# Patient Record
Sex: MALE | Race: WHITE | NOT HISPANIC OR LATINO | Employment: FULL TIME | URBAN - METROPOLITAN AREA
[De-identification: names, ages, dates, MRNs, and addresses within clinical notes are randomized per-mention and may not be internally consistent; named-entity substitution may affect disease eponyms.]

---

## 2017-07-18 ENCOUNTER — OFFICE VISIT (OUTPATIENT)
Dept: BEHAVIORAL HEALTH | Facility: PHYSICIAN GROUP | Age: 52
End: 2017-07-18
Payer: COMMERCIAL

## 2017-07-18 VITALS — SYSTOLIC BLOOD PRESSURE: 146 MMHG | BODY MASS INDEX: 23.6 KG/M2 | WEIGHT: 164.5 LBS | DIASTOLIC BLOOD PRESSURE: 101 MMHG

## 2017-07-18 DIAGNOSIS — F33.1 MODERATE EPISODE OF RECURRENT MAJOR DEPRESSIVE DISORDER (HCC): ICD-10-CM

## 2017-07-18 DIAGNOSIS — F41.0 PANIC DISORDER WITHOUT AGORAPHOBIA: ICD-10-CM

## 2017-07-18 PROCEDURE — 99204 OFFICE O/P NEW MOD 45 MIN: CPT | Performed by: STUDENT IN AN ORGANIZED HEALTH CARE EDUCATION/TRAINING PROGRAM

## 2017-07-18 NOTE — PROGRESS NOTES
PSYCHIATRIC Evaluation:  Requesting Physician: Walk-in  Supervising Physician:   Dr. Galicia  PCP: None at this time. Previously seen by Dr. Grajeda       ID: 53 y/o white male  who served 5 years in the [facility] ([facility]),  with long hx of depression and anxiety, presenting for new visit evaluation today, was previously followed by PCP for his psychiatric needs (last seen about 1 year ago).     Chief Complaint: Depression and anxiety    HPI:   Patient self-presented for new establishment visit with psychiatric evaluation completed today. Says he was followed by his PCP Dr. Grajeda who was prescribing Zoloft,Zyprexa, Klonopin (as needed) for his 'Bipolar-Depression'  but says his PCP 'moved on' and is not seeing him anymore. Says he has been off his Zoloft for 2-3 months (25mg daily) and ran out of his zyprexa (7.5mg daily) last week. Says he plans to get re-established with a new PCP- [facility]. 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 Says that his anxiety gets bad enough where he is unable to function at times and feels 'frozen.' Explains that he has been more depressed lately along with being more emotional and tearful with bouts of suicidal ideation last month- described as having recurring intrusive thoughts that may last a few days. Denies suicidal or homicidal ideation at this time and says he would never hurt himself and put his children though pain. Stress factors include recent divorce in March of 2017, currently lives with ex-wife who is an 'alcoholic.'    Psychiatric Review of Systems:current symptoms as reported by pt.  Depression:  Currently 'depressed' with low mood and energy. Sleep is not effected at this time but has hx of insomnia with severe  depression. +Guilt, poor concentration. Negative for Psychomotor retardation/agitation. +suicidal ideation last month but NONE at this time. Describes being more isolative lately which is what he says what happens most often when he is depressed.   Yane: denies hx of yane  Anxiety/Panic Attacks: increase anxiety over last 3-4 years with panic attacks weekly- refer to HPI. Of note: patient drinks 5-6 cups of coffee daily.   PTSD symptom: denies   Psychosis: denies all symptoms         Medical Review of Systems: as reported by pt. All systems reviewed. Only those found to be + are noted below. All others are negative.   Neurological:    TBIs: denies   SZs: denies   Strokes: denies     Other medical symptoms:   Thyroid: denies   Diabetes:denies   Cardiovascular disease: denies    Psychiatric Examination: observed phenomenon:  Vitals: 146/101 repeat was 159/106, denies headache, vision changes- likely high due to current anxiety  Musculoskeletal- mildly tremulous but otherwise WNL  Appearance: young male in 50's, appropriately groomed,   Behavior: answers all questions appropriately, mildly tearful when talking about depression.  Thoughts: linear, organized. -paranoia/delusions. -Ah/Vh.  Speech: mildly lethargic with mild decrease in volume.   Mood:  'depressed'  Affect:   Mood congruent, anxious  SI/HI:   Denies- suicidal ideation (roughly 1 month ago but none at this time).  Attention/Alertness: alert     Memory: grossly intact as evident by his thorough psychiatric and childhood history  Orientation:   Oriented to person, place, time, and situation  Fund of Knowledge:    Grossly intact  Insight/Judgement into symptoms:  Good/good        Past Psychiatric Hx:   -First seen psychiatrist post - career in his 20's after ex-wife recommended he see someone for his 'depression/isolation' - cannot remember name but was seeing her 'for many years'  -Last seen Psychiatrist Dr. Joya >2 years ago.   -Most recently  "followed up by PCP Dr. Grajeda for his psychiatric needs- however, Dr. Grajeda is not seeing him anymore and needs to be re-established with mental health for his psychotropic medications.   -Denies hx of Physical or Sexual Abuse  -Denies hx of Suicidal attempt  -Denies hx of Inpatient Psychiatric Hospitalizations  -Hx of seeing therapist X 1 about 10-12 years ago for 2 years- worked with his depression    PREVIOUS PSYCH MEDS:  -zoloft 25mg daily (last taken 2-3 months ago)  -zyprexa 7.5mg daily (last taken 1 week ago)  -Klonopin (last taken 2-3 months ago) - 0.5-1mg roughly 4 days out of the week. Denies withdrawal when he stopped.     Family Psychiatric Hx:  -mother with anxiety  -father with depression  - 1 sister (24y/o) and 1 brother (60y/o) with depression    Social Hx:  -Served in [employer] from [employer]' with honorable discharge - states he has his .  -Divorce X2 (most recently relationship with ex-wife of 7 years. )  -Currently lives with ex-wife ( in March of 17') and step son who is 18y/o  -Has 2 children from previous marriage living out of state: F 24y/o and M 26y/o      High school graduate  Currently works at the \"main\" postoffice in [place], [place]- greater than 20 years.       Drug/Alcohol/Tobacco Hx:   Drugs: denies, says he tried marijuana in his teens   Alcohol: denies   Tobacco: 1 PPD   Coffee: 5-6 cups daily    Medical Hx: labs, MARS, medications, etc were reviewed. Only those findings of potential interest to psychiatry are noted below:  Medical Conditions:   denies  Allergies: NKDA  Medications (currently prescribed at [facility]): none at this time  Labs: Previous labs from January 2016  ECG: none on file    Cranial Imaging: none on file        ASSESSMENT:   51 y/o male with long hx of Depression and Anxiety presenting for new mental health evaluation, wants to be established with Psychiatry for his psychotropic medications (previously followed by PCP for mental health needs). Although patient " appears to have physical symptoms of depression, his sleep is not effected at this time and currently denies suicidal and homicidal ideation. Patient appears to have been under-medicated with Zoloft previously but states that he has no side effects and it has 'worked some.' Will not restart his Zyprexa as I believe patient does not meet criteria for Bipolar or Psychosis at this time. Will trial Zoloft for depression and anxiety and will titrate dose as tolerated. Educated patient today on effects of caffeine and anxiety and I have requested patient to minimize his intake, which he a acknowledges. Patient would benefit from individual therapy and group therapy- will provide necessary resources/set up appointment today. Patient educated that he may want to get established with new PCP (currently does not have PCP) [facility] as he has served 5 years in the  with honorable discharged and currently has his  at home.     #Major Depressive Disorder  #JAY JAY- general anxiety disorder with panic attack   -r/o substance induced anxiety disorder (Risk factors: patient drinks 5-6 cups of coffee daily and smokes 1PPD)      PLAN:  -Trial Zoloft 50mg PO Daily for Depression and Anxiety/Panic attack.   -LABS (new visit) : CBC, CMP, Lipid panel, TSH and Free T4  -Recommend: Individual therapy, will need assessment for IOP vs PHP- will provide appropriate appointments today; Cut down on caffeine intake . Cut down on Smoking (currently at 1ppd)  -Needs new PCP, [facility] - f/u for hypertension (today 146/101 without symptoms) - likely increased due to anxiety.   -1 month f/u with mental health

## 2017-07-19 ENCOUNTER — OFFICE VISIT (OUTPATIENT)
Dept: BEHAVIORAL HEALTH | Facility: PHYSICIAN GROUP | Age: 52
End: 2017-07-19
Payer: COMMERCIAL

## 2017-07-19 DIAGNOSIS — F33.1 MAJOR DEPRESSIVE DISORDER, RECURRENT EPISODE, MODERATE (HCC): ICD-10-CM

## 2017-07-19 DIAGNOSIS — F41.1 GAD (GENERALIZED ANXIETY DISORDER): ICD-10-CM

## 2017-07-19 PROCEDURE — 90791 PSYCH DIAGNOSTIC EVALUATION: CPT | Performed by: MARRIAGE & FAMILY THERAPIST

## 2017-07-19 NOTE — BH THERAPY
[facility]  INITIAL ASSESSMENT    Name: Bruno Rai  MRN: [id number]  : 1965  Age: 52 y.o.  Date of assessment: 2017  PCP: Alex Grajeda M.D.  Persons in attendance: Patient  Total session time: 45 minutes      CHIEF COMPLAINT AND HISTORY OF PRESENTING PROBLEM:  (as stated by Patient):  Bruno Rai is a 52 y.o., White male referred for assessment by No ref. provider found.  Primary presenting issue includes   Chief Complaint   Patient presents with   • Depression   • Anxiety   .     FAMILY/SOCIAL HISTORY  Current living situation/household members: lives w/ ex wife ( in March after alycia of 7 yrs, living together till house sells) and her 16 yo son  Relevant family history/structure/dynamics: alycia 1st time for 20 yrs, has 2 grown children in [place], son 27, dalila 25 and one grandson; mom  10yrs ago, fa when he was in [employer]  Current family/social stressors: divorce; lack of support mostly due to depression  Quality/quantity of current family and/or social support: none  Does patient/parent report a family history of behavioral health issues, diagnoses, or treatment? Yes  Family History   Problem Relation Age of Onset   • Anxiety disorder Mother    • Depression Father    • Depression Sister    • Depression Brother    • No Known Problems Brother         BEHAVIORAL HEALTH TREATMENT HISTORY  Does patient/parent report a history of prior behavioral health treatment for patient? Yes:    Dates Level of Care Facilty/Provider Diagnosis/Problem Medications   In mid 's OP unk depr none   2 yrs ago same Dr. Dalal same Zyprexa                                                                 History of untreated behavioral health issues identified? No    MEDICAL HISTORY  Primary care behavioral health screenings: Patient Health Questionaire                                     If depressive symptoms identified deferred to follow up visit unless specifically addressed in  assesment and plan.    Interpretation of PHQ-9 Total Score   Score Severity   1-4 No Depression   5-9 Mild Depression   10-14 Moderate Depression   15-19 Moderately Severe Depression   20-27 Severe Depression       Past medical/surgical history:   Past Medical History   Diagnosis Date   • Heart burn    • Indigestion    • Dental disorder      upper and lower dentures   • Psychiatric problem      depression,anxiety      Past Surgical History   Procedure Laterality Date   • Other       vasectomy   • Biopsy general N/A 1/8/2016     Procedure: BIOPSY GENERAL PENILE;  Surgeon: Abel Kee M.D.;  Location: SURGERY [facility];  Service:    • Vasectomy          Medication Allergies:  Review of patient's allergies indicates no known allergies.   Medical history provided by patient during current evaluation: none relevant    Patient reports last physical exam: last year   Does patient/parent report any history of or current developmental concerns? No  Does patient/parent report nutritional concerns? Yes poor appetite  Does patient/parent report change in appetite or weight loss/gain? Yes has lost 30# in 3 yrs  Does patient/parent report history of eating disorder symptoms? No  Does patient/parent report dental problem? No  Does patient/parent report physical pain? No   Indicate if pain is acute or chronic, and location: na   Pain scale rating:       Does patient/parent report functional impact of medical, developmental, or pain issues?   N\A    EDUCATIONAL/LEARNING HISTORY  Is patient currently enrolled in a school/educational program?   No:   Highest grade level completed: some evelin  School performance/functioning: not asked  History of Special Education/repeated grades/learning issues: no  Preferred learning style: all  Current learning needs (large print, language barrier, etc):  no    EMPLOYMENT/RESOURCES  Is the patient currently employed? 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 Yes  Does patient identify impact of presenting issue on work functioning? Yes  Work or income-related stressors:  Stress from current position, anxiety began when promoted, has tried to return to being an employee but been refused; wants to transfer out of main PO     HISTORY:  Does patient report current or past enlistment? Yes [employer]   [If yes, complete below items]  Does patient report history of exposure to combat? No  Does patient report history of  sexual trauma? No  Does patient report other -related stressors? No    SPIRITUAL/CULTURAL/IDENTITY:  What are the patient’s/family’s spiritual beliefs or practices? Not asked  What is the patient’s cultural or ethnic background/identity? cauc  How does the patient identify their sexual orientation? het  How does the patient identify their gender? male  Does the patient identify any spiritual/cultural/identity factors as relevant to the presenting issue? No    LEGAL HISTORY  Has the patient ever been involved with juvenile, adult, or family legal systems? No   [If yes, trigger section below:]  Does patient report ever being a victim of a crime?  No  Does patient report involvement in any current legal issues?  No  Does patient report ever being arrested or committing a crime? No  Does patient report any current agency (parole/probation/CPS/) involvement? No    ABUSE/NEGLECT/TRAUMA SCREENING  Does patient report feeling “unsafe” in his/her home, or afraid of anyone? No  Does patient report any history of physical, sexual, or emotional abuse? No  Does parent or significant other report any of the above? na  Is there evidence of neglect by self? No  Is there evidence of neglect by a caregiver? No  Does the patient/parent report any history of CPS/APS/police involvement related to suspected abuse/neglect or domestic violence? No  Does the patient/parent report any other history of  potentially traumatic life events? No  Based on the information provided during the current assessment, is a mandated report of suspected abuse/neglect being made?  No     SAFETY ASSESSMENT - SELF  Does patient acknowledge current or past symptoms of dangerousness to self? Yes SI in past, not current  Does parent/significant other report patient has current or past symptoms of dangerousness to self? na      Recent change in frequency/specificity/intensity of suicidal thoughts or self-harm behavior? No  Current access to firearms, medications, or other identified means of suicide/self-harm? No  If yes, willing to restrict access to means of suicide/self-harm? na  Protective factors present: Moral objection to suicide and Actively engaged in treatment    Current Suicide Risk: Low  Crisis Safety Plan completed and copy given to patient: No    SAFETY ASSESSMENT - OTHERS  Does paor past symptoms of aggressive behavior or risk to others? No  Does parent/significant othtient acknowledge current or past symptoms of aggressive behavior or risk to others? na  Does parent/significant other report patient has current or past symptoms of aggressive behavior or risk to others? na    Recent change in frequency/specificity/intensity of thoughts or threats to harm others? No  Current access to firearms/other identified means of harm? No  If yes, willing to restrict access to weapons/means of harm? na  Protective factors present: Well-developed sense of empathy, Stable employment and Low rumination/obsession    Current Homicide Risk:  Not applicable  Crisis Safety Plan completed and copy given to patient? No  Based on information provided during the current assessment, is a mandated “duty to warn” being exercised? No    SUBSTANCE USE/ADDICTION HISTORY  [] Not applicable - patient 10 years of age or younger    Is there a family history of substance use/addiction? No  Does patient acknowledge or parent/significant other report use  of/dependence on substances? No  Last time patient used alcohol: yrs  Within the past week? No  Last time patient used marijuana: teens  Within the past month? No  Any other street drugs ever tried even once? No  Any use of prescription medications/pills without a prescription, or for reasons others than originally prescribed?  No  Any other addictive behavior reported (gambling, shopping, sex)? No     Drug History:  Amphetamine:  Amphetamine frequency: Never used      Cannibis:  Cannabis frequency: Past rare use  Cannabis method: Smoke      Cocaine:  Cocaine frequency: Never used      Ecstasy:  Ecstasy frequency: Never used      Hallucinogen:  Hallucinogen frequency: Never used      Inhalant:   Inhalant frequency: Never used      Opiate:  Opiate frequency: Never used  Cannabis frequency: Past rare use      Other:  Other drug frequency: Never used      Sedative:           What consequences does the patient associate with any of the above substance use and or addictive behaviors? None    Patient’s motivation/readiness for change: na    [] Patient denies use of any substance/addictive behaviors    STRENGTHS/ASSETS  Strengths Identified by interviewer: Insight into problems, Self-awareness, Cognitive flexibility and History of effective treatment  Strengths Identified by patient: good w/ people, responsible    MENTAL STATUS/OBSERVATIONS   Participation: Active verbal participation, Attentive and Open to feedback  Grooming: Neat  Orientation:Alert   Behavior: Calm  Eye contact: Good   Mood:Depressed and Anxious  Affect:Sad and Anxious  Thought process: Logical  Thought content:  Within normal limits  Speech: Rate within normal limits  Perception: Within normal limits  Memory: No gross evidence of memory deficits  Insight: Good  Judgment:  Good  Other:    Family/couple interaction observations: na    RESULTS OF SCREENING MEASURES:  [] Not applicable  Measure:   Score:     Measure:   Score:       CLINICAL FORMULATION: 52  yo Bruno here for long term depression and increasing anxiety for past 3 yrs, dating from his elevation to supervisor, which he wishes he hadn't done; says he feels hopeless and w/ low energy; just makes himself get up in the morning but doesn't enjoy anything; does golf but isn't really into anything; used to exercise but not now; doesn't eat well; knows he needs to stop smoking, did for 5 years but started again when his mom got sick w/ cancer and  10 yrs ago; drinks too much coffee; gets 3-4 panic attacks a week in the mornings; disc IOP but his schedule won't allow; gave him handouts from [facility] and recommended he get the book; disc self-talk; also do one positive thing daily, he chose exercising for 1/2 hour; also gave numbers for Smoking Cessation program with [facility] and central scheduling for a new PCP      DIAGNOSTIC IMPRESSION(S):  1. Major depressive disorder, recurrent episode, moderate (CMS-HCC)    2. JAY JAY (generalized anxiety disorder)          IDENTIFIED NEEDS/PLAN:  [If any of these marked, trigger DISPOSITION list]  Mood/anxiety and Occupational/Educational  Actively being addressed by [facility]    Does patient express agreement with the above plan? Yes     Referral appointment(s) scheduled? w/ this therapist       FEROZ Eckert.

## 2017-07-25 ENCOUNTER — APPOINTMENT (OUTPATIENT)
Dept: MEDICAL GROUP | Facility: MEDICAL CENTER | Age: 52
End: 2017-07-25
Payer: COMMERCIAL

## 2017-07-26 ENCOUNTER — TELEPHONE (OUTPATIENT)
Dept: MEDICAL GROUP | Facility: MEDICAL CENTER | Age: 52
End: 2017-07-26

## 2017-07-27 ENCOUNTER — OFFICE VISIT (OUTPATIENT)
Dept: MEDICAL GROUP | Facility: MEDICAL CENTER | Age: 52
End: 2017-07-27
Payer: COMMERCIAL

## 2017-07-27 VITALS
RESPIRATION RATE: 16 BRPM | SYSTOLIC BLOOD PRESSURE: 116 MMHG | HEART RATE: 75 BPM | HEIGHT: 71 IN | OXYGEN SATURATION: 95 % | TEMPERATURE: 98.7 F | BODY MASS INDEX: 23.04 KG/M2 | DIASTOLIC BLOOD PRESSURE: 78 MMHG | WEIGHT: 164.6 LBS

## 2017-07-27 DIAGNOSIS — Z00.00 PHYSICAL EXAM: ICD-10-CM

## 2017-07-27 DIAGNOSIS — Z80.0 FAMILY HISTORY OF COLON CANCER: ICD-10-CM

## 2017-07-27 DIAGNOSIS — F17.200 TOBACCO DEPENDENCE: ICD-10-CM

## 2017-07-27 DIAGNOSIS — Z76.89 ENCOUNTER TO ESTABLISH CARE: ICD-10-CM

## 2017-07-27 DIAGNOSIS — F33.1 MAJOR DEPRESSIVE DISORDER, RECURRENT EPISODE, MODERATE (HCC): ICD-10-CM

## 2017-07-27 DIAGNOSIS — N48.9 PENILE LESION: ICD-10-CM

## 2017-07-27 PROCEDURE — 99396 PREV VISIT EST AGE 40-64: CPT | Performed by: PHYSICIAN ASSISTANT

## 2017-07-27 ASSESSMENT — PATIENT HEALTH QUESTIONNAIRE - PHQ9
5. POOR APPETITE OR OVEREATING: 3 - NEARLY EVERY DAY
SUM OF ALL RESPONSES TO PHQ QUESTIONS 1-9: 13
CLINICAL INTERPRETATION OF PHQ2 SCORE: 3

## 2017-07-27 NOTE — ASSESSMENT & PLAN NOTE
This is a 52-year-old male who comes in today to establish care. During his psychiatry visit his blood pressure was evaluated and twice it was elevated. It was 140/100. He has no history of any known hypertension. Denies any chest pain or shortness of breath. He is generally healthy. He does smoke. Visit history of depression and anxiety. Takes Zoloft daily. He works for the [employer]. Currently is involved with a girlfriend who is from [place].

## 2017-07-27 NOTE — ASSESSMENT & PLAN NOTE
Spoke to him about the penile lesion. States he will use a steroid cream that keeps it from bothering him. It is a chronic condition.

## 2017-07-27 NOTE — ASSESSMENT & PLAN NOTE
He states that he had a colonoscopy about 5 years ago. Was told he needs one every 3-5 years. He has a mother who  of thyroid cancer but she also had a mass in her colon. She was in her early 70s. He did have a polyp removed last colonoscopy.

## 2017-07-27 NOTE — Clinical Note
[facility]  Tello Mendoza PA-C  [address]  [facility] [address]  Fax: [phone]   Authorization for Release/Disclosure of   Protected Health Information   Name: JUAQUIN WALKER : 1965 SSN: [id number]   Address: [facility]  [place] [address] Phone:    [phone] (home)    I authorize the entity listed below to release/disclose the PHI below to:   [facility]/Tello Mendoza PA-C and Tello Mendoza PA-C   Provider or Entity Name:  [facility]   Address   City, State, Zip [place], [place]   Phone:      Fax:     Reason for request: continuity of care   Information to be released:    [xx  ] LAST COLONOSCOPY,  including any PATH REPORT and follow-up  [  ] LAST FIT/COLOGUARD RESULT [  ] LAST DEXA  [  ] LAST MAMMOGRAM  [  ] LAST PAP  [  ] LAST LABS [  ] RETINA EXAM REPORT  [  ] IMMUNIZATION RECORDS  [  ] Release all info      [  ] Check here and initial the line next to each item to release ALL health information INCLUDING  _____ Care and treatment for drug and / or alcohol abuse  _____ HIV testing, infection status, or AIDS  _____ Genetic Testing    DATES OF SERVICE OR TIME PERIOD TO BE DISCLOSED: _____________  I understand and acknowledge that:  * This Authorization may be revoked at any time by you in writing, except if your health information has already been used or disclosed.  * Your health information that will be used or disclosed as a result of you signing this authorization could be re-disclosed by the recipient. If this occurs, your re-disclosed health information may no longer be protected by State or Federal laws.  * You may refuse to sign this Authorization. Your refusal will not affect your ability to obtain treatment.  * This Authorization becomes effective upon signing and will  on (date) __________.      If no date is indicated, this Authorization will  one (1) year from the signature date.    Name: Juaquin Walker    Signature:   Date:     2017            PLEASE FAX REQUESTED RECORDS BACK TO: [phone]

## 2017-07-27 NOTE — PROGRESS NOTES
"Subjective:   Bruno Rai is a 52 y.o. male here today for establishing care and for being told he may have hypertension after 2 blood pressure readings at the psychiatry office.    Physical exam  This is a 52-year-old male who comes in today to establish care. During his psychiatry visit his blood pressure was evaluated and twice it was elevated. It was 140/100. He has no history of any known hypertension. Denies any chest pain or shortness of breath. He is generally healthy. He does smoke. Visit history of depression and anxiety. Takes Zoloft daily. He works for the [employer]. Currently is involved with a girlfriend who is from [place].    Penile lesion  Spoke to him about the penile lesion. States he will use a steroid cream that keeps it from bothering him. It is a chronic condition.    Family history of colon cancer  He states that he had a colonoscopy about 5 years ago. Was told he needs one every 3-5 years. He has a mother who  of thyroid cancer but she also had a mass in her colon. She was in her early 70s. He did have a polyp removed last colonoscopy.         Current medicines (including changes today)  Current Outpatient Prescriptions   Medication Sig Dispense Refill   • Multiple Vitamins-Minerals (MULTI FOR HIM 50+ PO) Take  by mouth.     • sertraline (ZOLOFT) 50 MG Tab Take 1 Tab by mouth every day. 30 Tab 2     No current facility-administered medications for this visit.     He  has a past medical history of Heart burn; Indigestion; Dental disorder; and Psychiatric problem.    ROS   No chest pain, no shortness of breath, no abdominal pain and all other systems were reviewed and are negative.       Objective:     Blood pressure 116/78, pulse 75, temperature 37.1 °C (98.7 °F), resp. rate 16, height 1.797 m (5' 10.75\"), weight 74.662 kg (164 lb 9.6 oz), SpO2 95 %. Body mass index is 23.12 kg/(m^2).   Physical Exam:  Constitutional: Alert, no distress.  Skin: Warm, dry, good turgor, no rashes " in visible areas.  Eye: Equal, round and reactive, conjunctiva clear, lids normal.  ENMT: Lips without lesions, good dentition, oropharynx clear.  Neck: Trachea midline, no masses.   Lymph: No cervical or supraclavicular lymphadenopathy  Respiratory: Unlabored respiratory effort, lungs clear to auscultation, no wheezes, no ronchi.  Cardiovascular: Normal S1, S2, no murmur, no edema.  Abdomen: Soft, non-tender, no masses.  Psych: Alert and oriented x3, normal affect and mood.    Repeated blood pressure reading it was 118/58.    Assessment and Plan:   The following treatment plan was discussed    1. Physical exam  Blood pressure today is within normal limits. We'll add PSA level to the other orders from his psychiatry. Will contact with results.  - PROSTATE SPECIFIC AG DIAGNOSTIC; Future    2. Family history of colon cancer  Referred to GI for colonoscopy screening. We'll try to obtain previous medical records.  - REFERRAL TO GASTROENTEROLOGY    3. Tobacco dependence  In future we'll discuss smoking cessation further although currently he is on patches.    4. Penile lesion  Did not evaluate but it will monitor in the future. May continue steroid cream as directed.    5. Major depressive disorder, recurrent episode, moderate (CMS-HCC)  Chronic condition. Follows with psychiatry. Continue Zoloft as directed.  - Patient has been identified as being depressed and appropriate orders and counseling have been given    6. Encounter to establish care      Followup: No Follow-up on file.    Please note that this dictation was created using voice recognition software. I have made every reasonable attempt to correct obvious errors, but I expect that there are errors of grammar and possibly content that I did not discover before finalizing the note.

## 2017-08-15 ENCOUNTER — OFFICE VISIT (OUTPATIENT)
Dept: BEHAVIORAL HEALTH | Facility: PHYSICIAN GROUP | Age: 52
End: 2017-08-15
Payer: COMMERCIAL

## 2017-08-15 DIAGNOSIS — F33.1 MAJOR DEPRESSIVE DISORDER, RECURRENT EPISODE, MODERATE (HCC): ICD-10-CM

## 2017-08-15 DIAGNOSIS — F41.1 GAD (GENERALIZED ANXIETY DISORDER): ICD-10-CM

## 2017-08-15 PROCEDURE — 99214 OFFICE O/P EST MOD 30 MIN: CPT | Performed by: STUDENT IN AN ORGANIZED HEALTH CARE EDUCATION/TRAINING PROGRAM

## 2017-08-15 RX ORDER — SERTRALINE HYDROCHLORIDE 100 MG/1
TABLET, FILM COATED ORAL
Qty: 45 TAB | Refills: 1 | Status: SHIPPED | OUTPATIENT
Start: 2017-08-15 | End: 2017-10-23 | Stop reason: SDUPTHER

## 2017-08-15 NOTE — PROGRESS NOTES
[facility]  PSYCHIATRIC FOLLOW-UP NOTE    Name: Bruno Rai  MRN: [id number]  : 1965  Age: 52 y.o.  Date of assessment: 8/15/2017  PCP: Tello Mendoza PA-C  Persons in attendance: Patient  Total face-to-face time: 30 minutes    REASON FOR VISIT/CHIEF COMPLAINT (as stated by Patient):  Bruno Rai is a 52 y.o., White male, attending follow-up appointment for depression/anxiety, previously seen by this writer on 17.    CURRENT PSYCHOTROPIC MEDS:  -zoloft 50mg PO Q Daily.    HISTORY OF PRESENT ILLNESS:    Says that since his previous appointment he has cut down on his caffeine intake from 6 cups/day to currently 3 cups day. Says that he continues to have intermittent depressed mood and does not feel any changes from his current dose of Zoloft. Has attended his intake evaluation for behavioral health psychotherapy and plans to continue with his therapy. 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 Sleeping 6 hours nightly, says appetite is fair/poor at times but otherwise no further changes noted. Talked about titration Zoloft to maximize therapeutic benefit and patient agrees.     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  Participation: Active verbal participation  Grooming:Casual  Orientation: Fully Oriented  Eye contact: Good  Behavior:Calm   Mood:  Depressed  Affect: Constricted  Thought process: Logical  Thought content:  Within normal limits  Speech: Rate within normal limits  Perception:  Within normal limits  Memory:  No gross evidence of memory deficits  Insight: Good  Judgment: Good      Current risk:    Suicide: Low   Homicide: Low   Self-harm: Low  Relapse: Low  Other:   Crisis Safety Plan reviewed?Yes  If evidence of imminent risk is present, intervention/plan:    Medical Records/Labs/Diagnostic Tests Reviewed: none from 2017    Medical Records/Labs/Diagnostic Tests Ordered: CBC/CMP/LIPIDS/TSH and Free T4/Vit D/Vit B12 ordered today    DIAGNOSTIC IMPRESSION(S):  No diagnosis found.         ASSESSMENT:   53 y/o male with long hx of Depression and Anxiety presenting for follow-up visit. Although patient appears to have physical symptoms of depression, his sleep is not effected at this time and currently denies suicidal and homicidal ideation. Patient appears to have been under-medicated with Zoloft previously but states that he has no side effects and it has 'worked some,' currently states no benefit from 50mg Daily.  Will not restart his Zyprexa as I believe patient does not meet criteria for Bipolar or Psychosis at this time. Will trial Zoloft for depression and anxiety and will titrate dose as tolerated. Educated patient today on effects of caffeine and anxiety and I have requested patient to minimize his intake, which he a acknowledges. Patient encouraged to go to individual therapy and group therapy. Patient [facility] years in the  with honorable discharged and currently has his  at home.     #Major Depressive Disorder  #JAY JAY- general anxiety disorder with panic attack    -r/o substance induced anxiety disorder (Risk factors: patient drinks 3 cups/day (down from 6 cups/day 1 month ago),  smokes 1PPD)      PLAN:  -Trial Zoloft 50mg PO Daily for Depression and Anxiety/Panic  attack. Increase to 100mg PO Daily X 2 weeks, then increase to 150mg PO Daily thereafter. Will maximize therapeutic benefit before trial of SNRI.   -LABS (new visit) : CBC/CMP/LIPIDS/TSH and Free T4/Vit D/Vit B12 ordered today  -Recommend: Continue Individual therapy, will provide appropriate appointments today; Cut down on caffeine intake (currently at 3 cups/daily (down from 6 cups/daily) . Cut down on Smoking (currently at 1ppd)  -Established new PCP with [facility], [facility], [facility] (honerable discharge- [facility])  -1 month f/u with mental health        Maged Lopez M.D.

## 2017-09-18 ENCOUNTER — APPOINTMENT (OUTPATIENT)
Dept: BEHAVIORAL HEALTH | Facility: PHYSICIAN GROUP | Age: 52
End: 2017-09-18
Payer: COMMERCIAL

## 2017-10-16 ENCOUNTER — APPOINTMENT (OUTPATIENT)
Dept: BEHAVIORAL HEALTH | Facility: PHYSICIAN GROUP | Age: 52
End: 2017-10-16
Payer: COMMERCIAL

## 2017-10-17 ENCOUNTER — OFFICE VISIT (OUTPATIENT)
Dept: BEHAVIORAL HEALTH | Facility: PHYSICIAN GROUP | Age: 52
End: 2017-10-17

## 2017-10-17 DIAGNOSIS — F41.1 GAD (GENERALIZED ANXIETY DISORDER): ICD-10-CM

## 2017-10-17 DIAGNOSIS — F33.1 MAJOR DEPRESSIVE DISORDER, RECURRENT EPISODE, MODERATE (HCC): ICD-10-CM

## 2017-10-17 PROCEDURE — 90834 PSYTX W PT 45 MINUTES: CPT | Performed by: MARRIAGE & FAMILY THERAPIST

## 2017-10-17 NOTE — BH THERAPY
[facility]  Therapy Progress Note    Patient Name: Bruno Rai  Patient MRN: [id number]  Today's Date: 10/17/2017     Type of session:Individual psychotherapy  Length of session: 45 minutes  Persons in attendance:Patient    Subjective/New Info: has been feeling more depressed; tried reconciling w wife and it didn't work but they are still living in house and he's unhappy; indecisive about what he wants to happen; is applying for jobs in the [place] near his son, unsure when/if he will hear; continues to be unhappy w/ his job; didn't follow up w/ smoking cessation    Objective/Observations:   Participation: Active verbal participation   Grooming: Neat   Cognition: Alert   Eye contact: Good   Mood: Depressed   Affect: Sad   Thought process: Logical   Speech: Rate within normal limits   Other:     Diagnoses:   1. Major depressive disorder, recurrent episode, moderate (CMS-HCC)    2. JAY JAY (generalized anxiety disorder)         Current risk:   SUICIDE: Low   Homicide: Not applicable   Self-harm: Not applicable   Relapse: Not applicable   Other:    Safety Plan reviewed? Not Indicated   If evidence of imminent risk is present, intervention/plan:     Therapeutic Intervention(s): Cognitive modification, Distress tolerance skills, Goal-setting, Problem-solving and Self-care skills    Treatment Goal(s)/Objective(s) addressed: says sometimes he wants to leave, sometimes to stay (w/ wife); will try to do things for self     Progress toward Treatment Goals: Mild decline    Plan:  - Next appointment scheduled:  10/31/2017    NOEL Eckert  10/17/2017

## 2017-10-23 ENCOUNTER — OFFICE VISIT (OUTPATIENT)
Dept: BEHAVIORAL HEALTH | Facility: PHYSICIAN GROUP | Age: 52
End: 2017-10-23
Payer: COMMERCIAL

## 2017-10-23 DIAGNOSIS — F33.1 MAJOR DEPRESSIVE DISORDER, RECURRENT EPISODE, MODERATE (HCC): ICD-10-CM

## 2017-10-23 DIAGNOSIS — F41.1 GAD (GENERALIZED ANXIETY DISORDER): ICD-10-CM

## 2017-10-23 PROCEDURE — 99214 OFFICE O/P EST MOD 30 MIN: CPT | Performed by: STUDENT IN AN ORGANIZED HEALTH CARE EDUCATION/TRAINING PROGRAM

## 2017-10-23 RX ORDER — ARIPIPRAZOLE 5 MG/1
TABLET ORAL
Qty: 30 TAB | Refills: 1 | Status: SHIPPED | OUTPATIENT
Start: 2017-10-23 | End: 2017-12-04 | Stop reason: SDUPTHER

## 2017-10-23 RX ORDER — SERTRALINE HYDROCHLORIDE 100 MG/1
TABLET, FILM COATED ORAL
Qty: 60 TAB | Refills: 1 | Status: SHIPPED | OUTPATIENT
Start: 2017-10-23 | End: 2017-12-04 | Stop reason: SDUPTHER

## 2017-10-23 NOTE — PROGRESS NOTES
[facility]  PSYCHIATRIC FOLLOW-UP NOTE    Name: Bruno Rai  MRN: [id number]  : 1965  Age: 52 y.o.  Date of assessment: 10/23/17  PCP: Tello Mendoza PA-C  Persons in attendance: Patient  Total face-to-face time: 30 minutes    REASON FOR VISIT/CHIEF COMPLAINT (as stated by Patient):  Bruno Rai is a 52 y.o., White male, attending follow-up appointment for depression/anxiety, previously seen by this writer on 08/15/17.    CURRENT PSYCHOTROPIC MEDS:  -zoloft 50mg PO Q Daily.    HISTORY OF PRESENT ILLNESS:    Says that since his previous appointment he has felt minimal improvement on zoloft- more improvement in anxiety over depression. Says that most of his recent stressors include having a difficult relationship with his 'Ex' that he is currently living with. Admits that he has a very hard time expressing his feelings which in turn cause him great stress. Advised to practice opening up about his feelings (get in touch with his feelings) which should alleviate some of his pain/stress. Discussed titration of zoloft while adding low dose Abilify as adjunct for refractory depression.     PSYCHOSOCIAL CHANGES SINCE PREVIOUS CONTACT:  Ongoing difficulty with his ex whom he currently lives with     RESPONSE TO TREATMENT:  Says he feels less anxious at work which is a new improvement, however, minimal improvement in depression    MEDICATION SIDE EFFECTS:  None reported    REVIEW OF SYSTEMS:        Constitutional negative   Eyes negative   Ears/Nose/Mouth/Throat negative   Cardiovascular negative   Respiratory negative   Gastrointestinal negative   Genitourinary negative   Muscular negative   Integumentary negative   Neurological negative   Endocrine negative   Hematologic/Lymphatic negative       PSYCHIATRIC EXAMINATION/MENTAL STATUS  There were no vitals taken for this visit.  Participation: Active verbal participation  Grooming:Casual  Orientation: Fully Oriented  Eye contact:  Good  Behavior:Calm   Mood: Depressed  Affect: Constricted  Thought process: Logical  Thought content:  Within normal limits  Speech: Rate within normal limits  Perception:  Within normal limits  Memory:  No gross evidence of memory deficits  Insight: Good  Judgment: Good      Current risk:    Suicide: Low   Homicide: Low   Self-harm: Low  Relapse: Low  Other:   Crisis Safety Plan reviewed?Yes    Medical Records/Labs/Diagnostic Tests Reviewed: none from 2017    Medical Records/Labs/Diagnostic Tests Ordered: CBC/CMP/LIPIDS/TSH and Free T4/Vit D/Vit B12 ordered today        ASSESSMENT:   51 y/o male with long hx of Depression and Anxiety presenting for follow-up visit. Although patient appears to have physical symptoms of depression, his sleep is not effected at this time and currently denies suicidal and homicidal ideation. Patient appears to have been under-medicated with Zoloft previously but states that he has no side effects and it has 'worked some,' currently states minimal benefit from 150mg Daily. Will continue to titrate Zoloft for depression and anxiety as kory Will not restart his Zyprexa as I believe patient does not meet criteria for Bipolar or Psychosis at this time. Trial low dose abilify for refractory depression. Patient encouraged to continue individual therapy and group therapy. Patient [facility] 5 years in the  with honorable discharged and currently has his  at home.     #Major Depressive Disorder  #JAY JAY- general anxiety disorder with panic attack    -r/o substance induced anxiety disorder (Risk factors: patient drinks 3 cups/day (down from 6 cups/day 1 month ago),  smokes 1PPD)      PLAN:  -titrate Zoloft from 150mg Daily to 200mg Daily. Will maximize therapeutic benefit before trial of SNRI.   -Trial Abilify 2.5mg PO QDaily X 7 days, then increase to 5mg PO QDaily as tolerate as adjunct for refractory depression.    -LABS (new visit) : CBC/CMP/LIPIDS/TSH and Free T4/Vit D/Vit B12 ordered today  -Recommend: Continue Individual therapy, will provide appropriate appointments today; Cut down on caffeine intake (currently at 3 cups/daily (down from 6 cups/daily) . Cut down on Smoking (currently at 1ppd)  -Established new PCP [facility], [facility], [facility] (honerable discharge- [facility])  -1 month f/u with mental health        Maged Lopez M.D.

## 2017-10-31 ENCOUNTER — APPOINTMENT (OUTPATIENT)
Dept: BEHAVIORAL HEALTH | Facility: PHYSICIAN GROUP | Age: 52
End: 2017-10-31
Payer: COMMERCIAL

## 2017-12-04 ENCOUNTER — OFFICE VISIT (OUTPATIENT)
Dept: BEHAVIORAL HEALTH | Facility: PHYSICIAN GROUP | Age: 52
End: 2017-12-04
Payer: COMMERCIAL

## 2017-12-04 DIAGNOSIS — F33.1 MAJOR DEPRESSIVE DISORDER, RECURRENT EPISODE, MODERATE (HCC): ICD-10-CM

## 2017-12-04 DIAGNOSIS — F41.1 GAD (GENERALIZED ANXIETY DISORDER): ICD-10-CM

## 2017-12-04 PROCEDURE — 99214 OFFICE O/P EST MOD 30 MIN: CPT | Performed by: STUDENT IN AN ORGANIZED HEALTH CARE EDUCATION/TRAINING PROGRAM

## 2017-12-04 RX ORDER — ARIPIPRAZOLE 5 MG/1
TABLET ORAL
Qty: 30 TAB | Refills: 3 | Status: SHIPPED | OUTPATIENT
Start: 2017-12-04 | End: 2017-12-22 | Stop reason: SDUPTHER

## 2017-12-04 RX ORDER — SERTRALINE HYDROCHLORIDE 100 MG/1
TABLET, FILM COATED ORAL
Qty: 60 TAB | Refills: 3 | Status: SHIPPED | OUTPATIENT
Start: 2017-12-04 | End: 2017-12-22 | Stop reason: SDUPTHER

## 2017-12-04 NOTE — PROGRESS NOTES
[facility]  PSYCHIATRIC FOLLOW-UP NOTE    Name: Bruno Rai  MRN: [id number]  : 1965  Age: 52 y.o.  Date of assessment: 17  PCP: Tello Mendoza PA-C  Persons in attendance: Patient  Total face-to-face time: 30 minutes    REASON FOR VISIT/CHIEF COMPLAINT (as stated by Patient):  Bruno Rai is a 52 y.o., White male, attending follow-up appointment for depression/anxiety, previously seen by this writer on  10/23/17    CURRENT PSYCHOTROPIC MEDS:  -zoloft 200 mg PO Q Daily.  -abilify 5mg PO Qdaily     HISTORY OF PRESENT ILLNESS:    Says that since changes were made to his medication regiment last appointment his anxiety has completely gone away. Says that he is not depressed at this time as well, and is the most stable he has felt in a long time. Says work is going great, continues to live with his ex-partner which has been difficult but says he is managing. No difficulties with sleep or appetite, will refill meds today and f/u 2 months.    PSYCHOSOCIAL CHANGES SINCE PREVIOUS CONTACT:  Ongoing difficulty with his ex whom he currently lives with     RESPONSE TO TREATMENT:  Anxiety is gone, mood has stabilized= patient is doing well on current medication regiment.     MEDICATION SIDE EFFECTS:  None reported    REVIEW OF SYSTEMS:        Constitutional negative   Eyes negative   Ears/Nose/Mouth/Throat negative   Cardiovascular negative   Respiratory negative   Gastrointestinal negative   Genitourinary negative   Muscular negative   Integumentary negative   Neurological negative   Endocrine negative   Hematologic/Lymphatic negative       PSYCHIATRIC EXAMINATION/MENTAL STATUS  There were no vitals taken for this visit.  Participation: Active verbal participation  Grooming:Casual  Orientation: Fully Oriented  Eye contact: Good  Behavior:Calm   Mood: Depressed  Affect: Constricted  Thought process: Logical  Thought content:  Within normal limits  Speech: Rate within normal  limits  Perception:  Within normal limits  Memory:  No gross evidence of memory deficits  Insight: Good  Judgment: Good      Current risk:    Suicide: Low   Homicide: Low   Self-harm: Low  Relapse: Low  Other:   Crisis Safety Plan reviewed?Yes    Medical Records/Labs/Diagnostic Tests Reviewed: none from 2017    Medical Records/Labs/Diagnostic Tests Ordered: CBC/CMP/LIPIDS/TSH and Free T4/Vit D/Vit B12 ordered today        ASSESSMENT:   51 y/o male with long hx of Depression and Anxiety presenting for follow-up visit. Although patient appears to have physical symptoms of depression, his sleep is not effected at this time and currently denies suicidal and homicidal ideation. Patient appears to have been under-medicated with Zoloft previously but states that he has no side effects and it has 'worked some,' currently states minimal benefit from 150mg Daily. Will continue to titrate Zoloft for depression and anxiety as kory Will not restart his Zyprexa as I believe patient does not meet criteria for Bipolar or Psychosis at this time. Trial low dose abilify for refractory depression. Patient encouraged to continue individual therapy and group therapy. Patient educated [facility] 5 years in the  with honorable discharged and currently has his  at home.     #Major Depressive Disorder  #JAY JAY- general anxiety disorder with panic attack    -r/o substance induced anxiety disorder (Risk factors: patient drinks 3 cups/day (down from 6 cups/day 1 month ago),  smokes 1PPD)      PLAN:  -Cont. Zoloft  200mg Daily for depression/anxiety  -Cont Abilify 5mg PO QDaily for refractory depression  -Established new PCP with [facility], [facility], [facility] (honerable discharge- [facility])  -2 month f/u with mental health        Maged Lopez M.D.

## 2017-12-22 RX ORDER — ARIPIPRAZOLE 5 MG/1
TABLET ORAL
Qty: 30 TAB | Refills: 3 | Status: SHIPPED | OUTPATIENT
Start: 2017-12-22 | End: 2018-05-31

## 2017-12-22 RX ORDER — SERTRALINE HYDROCHLORIDE 100 MG/1
TABLET, FILM COATED ORAL
Qty: 60 TAB | Refills: 3 | Status: SHIPPED | OUTPATIENT
Start: 2017-12-22 | End: 2018-05-31

## 2018-05-31 ENCOUNTER — OFFICE VISIT (OUTPATIENT)
Dept: MEDICAL GROUP | Facility: MEDICAL CENTER | Age: 53
End: 2018-05-31
Payer: COMMERCIAL

## 2018-05-31 VITALS
SYSTOLIC BLOOD PRESSURE: 96 MMHG | OXYGEN SATURATION: 96 % | HEIGHT: 71 IN | WEIGHT: 169 LBS | DIASTOLIC BLOOD PRESSURE: 60 MMHG | HEART RATE: 79 BPM | TEMPERATURE: 97.9 F | BODY MASS INDEX: 23.66 KG/M2

## 2018-05-31 DIAGNOSIS — Z00.00 ANNUAL PHYSICAL EXAM: ICD-10-CM

## 2018-05-31 DIAGNOSIS — K63.5 SESSILE COLONIC POLYP: ICD-10-CM

## 2018-05-31 DIAGNOSIS — N48.9 PENILE LESION: ICD-10-CM

## 2018-05-31 PROCEDURE — 99396 PREV VISIT EST AGE 40-64: CPT | Performed by: PHYSICIAN ASSISTANT

## 2018-05-31 NOTE — LETTER
[facility]  Tello Mendoza P.A.-C.  [address]  [place] [address]  Fax: [phone]   Authorization for Release/Disclosure of   Protected Health Information   Name: JUAQUIN WALKER : 1965 SSN: [id number]   Address: [address]  [place] [address] Phone:    [phone] (home)    I authorize the entity listed below to release/disclose the PHI below to:   [facility]/Tello Mendoza P.A.-C. and Tello Mendoza P.A.-C.   Provider or Entity Name:  [facility]   Address   City, State, Zip   Phone:      Fax:     Reason for request: continuity of care   Information to be released:    [ X ] LAST COLONOSCOPY,  including any PATH REPORT and follow-up  [  ] LAST FIT/COLOGUARD RESULT [  ] LAST DEXA  [  ] LAST MAMMOGRAM  [  ] LAST PAP  [  ] LAST LABS [  ] RETINA EXAM REPORT  [  ] IMMUNIZATION RECORDS  [  ] Release all info      [  ] Check here and initial the line next to each item to release ALL health information INCLUDING  _____ Care and treatment for drug and / or alcohol abuse  _____ HIV testing, infection status, or AIDS  _____ Genetic Testing    DATES OF SERVICE OR TIME PERIOD TO BE DISCLOSED: _____________  I understand and acknowledge that:  * This Authorization may be revoked at any time by you in writing, except if your health information has already been used or disclosed.  * Your health information that will be used or disclosed as a result of you signing this authorization could be re-disclosed by the recipient. If this occurs, your re-disclosed health information may no longer be protected by State or Federal laws.  * You may refuse to sign this Authorization. Your refusal will not affect your ability to obtain treatment.  * This Authorization becomes effective upon signing and will  on (date) __________.      If no date is indicated, this Authorization will  one (1) year from the signature date.    Name: Juaquin Walker    Signature:   Date:     2018       PLEASE FAX REQUESTED RECORDS BACK TO: [phone]

## 2018-05-31 NOTE — ASSESSMENT & PLAN NOTE
This is a 53-year-old male who is here today for a physical. No new complaints today. Still complains of a penis lesion at times that is bothersome. Uses a steroid cream with some relief. The past we had discussed it but I have not evaluated. He has been referred previously to dermatologist but it was a female provider. Like to see male dermatologist. Still smoking routinely. His depression and anxiety are controlled. He is currently not on any medications. Denies any homicidal or suicidal ideations. Has an appointment next month with psychiatry. Complains of allergies over the last 2-3 days. Complains itchy eyes and itchy skin. Using Claritin and Benadryl. No significant relief. Denies any nasal congestion or drainage. He never followed up with GI for a family history of colon cancer. Is not certain when his last colonoscopy was but is likely about 5 years ago. It was performed [facility].

## 2018-05-31 NOTE — PROGRESS NOTES
"Subjective:   Bruno Rai is a 53 y.o. male here today for annual physical.    Annual physical exam  This is a 53-year-old male who is here today for a physical. No new complaints today. Still complains of a penis lesion at times that is bothersome. Uses a steroid cream with some relief. The past we had discussed it but I have not evaluated. He has been referred previously to dermatologist but it was a female provider. Like to see male dermatologist. Still smoking routinely. His depression and anxiety are controlled. He is currently not on any medications. Denies any homicidal or suicidal ideations. Has an appointment next month with psychiatry. Complains of allergies over the last 2-3 days. Complains itchy eyes and itchy skin. Using Claritin and Benadryl. No significant relief. Denies any nasal congestion or drainage. He never followed up with GI for a family history of colon cancer. Is not certain when his last colonoscopy was but is likely about 5 years ago. It was performed at [facility].       Current medicines (including changes today)  No current outpatient prescriptions on file.     No current facility-administered medications for this visit.      He  has a past medical history of Dental disorder; Heart burn; Indigestion; and Psychiatric problem.    Social History and Family History were reviewed and updated.    ROS   No chest pain, no shortness of breath, no abdominal pain and all other systems were reviewed and are negative.       Objective:     Blood pressure (!) 96/60, pulse 79, temperature 36.6 °C (97.9 °F), height 1.803 m (5' 11\"), weight 76.7 kg (169 lb), SpO2 96 %. Body mass index is 23.57 kg/m².   Physical Exam:  Constitutional: Alert, no distress.  Skin: Warm, dry, good turgor, no rashes in visible areas.  Eye: Equal, round and reactive, conjunctiva clear, lids normal.  ENMT: Lips without lesions, good dentition, oropharynx clear.  Neck: Trachea midline, no masses.   Lymph: No cervical or " supraclavicular lymphadenopathy  Respiratory: Unlabored respiratory effort, lungs clear to auscultation, no wheezes, no ronchi.  Cardiovascular: Normal S1, S2, no murmur, no edema.  Abdomen: Soft, non-tender, no masses.  Psych: Alert and oriented x3, normal affect and mood.        Assessment and Plan:   The following treatment plan was discussed    1. Annual physical exam  Ordered labs fasting. He will be contacted with the results. Will obtain previous medical records from [facility]. Refer to dermatology given the penis lesion. Advised to stop smoking and provided information to stop. Provided information regarding his allergies. May contact me with any worsening concerns.  - LIPID PROFILE; Future  - PROSTATE SPECIFIC AG DIAGNOSTIC; Future  - COMP METABOLIC PANEL; Future  - HEMOGLOBIN A1C; Future    2. Penile lesion  Referred to dermatology.  - REFERRAL TO DERMATOLOGY      Followup: Return in about 1 year (around 5/31/2019), or if symptoms worsen or fail to improve.    Please note that this dictation was created using voice recognition software. I have made every reasonable attempt to correct obvious errors, but I expect that there are errors of grammar and possibly content that I did not discover before finalizing the note.

## 2018-06-25 ENCOUNTER — OFFICE VISIT (OUTPATIENT)
Dept: BEHAVIORAL HEALTH | Facility: PHYSICIAN GROUP | Age: 53
End: 2018-06-25
Payer: COMMERCIAL

## 2018-06-25 DIAGNOSIS — F41.1 GAD (GENERALIZED ANXIETY DISORDER): ICD-10-CM

## 2018-06-25 DIAGNOSIS — F33.1 MAJOR DEPRESSIVE DISORDER, RECURRENT EPISODE, MODERATE (HCC): ICD-10-CM

## 2018-06-25 PROCEDURE — 99214 OFFICE O/P EST MOD 30 MIN: CPT | Performed by: STUDENT IN AN ORGANIZED HEALTH CARE EDUCATION/TRAINING PROGRAM

## 2018-06-25 RX ORDER — PROPRANOLOL HYDROCHLORIDE 10 MG/1
10 TABLET ORAL 3 TIMES DAILY
Qty: 90 TAB | Refills: 2 | Status: SHIPPED | OUTPATIENT
Start: 2018-06-25 | End: 2020-09-11

## 2018-06-25 RX ORDER — ARIPIPRAZOLE 5 MG/1
5 TABLET ORAL DAILY
Qty: 30 TAB | Refills: 1 | Status: SHIPPED | OUTPATIENT
Start: 2018-06-25 | End: 2020-09-11

## 2018-06-25 NOTE — PROGRESS NOTES
[facility]  PSYCHIATRIC FOLLOW-UP NOTE    Name: Bruno Rai  MRN: [id number]  : 1965  Age: 52 y.o.  Date of assessment: 18  PCP: Tello Mendoza PA-C  Persons in attendance: Patient  Total face-to-face time: 30 minutes    REASON FOR VISIT/CHIEF COMPLAINT (as stated by Patient):  Bruno Rai is a 52 y.o., White male, attending follow-up appointment for depression/anxiety, previously seen by this writer on  18    CURRENT PSYCHOTROPIC MEDS:  -zoloft 200 mg PO Q Daily- stopped 1 month ago  -abilify 5mg PO Qdaily - stopped 1 month ago    HISTORY OF PRESENT ILLNESS:    Says that since his last appointment he stopped all his medications because of side effect of sexual dysfunction which I explained was likely due to zoloft. Patient stated that his mood was doing better until this week when he noticed his depression and anxiety is starting to return and is afraid that it was become a lot worse very soon. Willing to retrial abilify at this time and discussed starting propranolol 10mg PO BID for anxiety which he agreed to. Otherwise says he has been doing fine with work and is looking forward to meeting his next provider (residency clinic)- discussed f/u in 3 weeks.     PSYCHOSOCIAL CHANGES SINCE PREVIOUS CONTACT:  Moved out on his own.     RESPONSE TO TREATMENT:  Anxiety and depression have returned since stopping combination of zoloft and abilify    MEDICATION SIDE EFFECTS:  None reported    REVIEW OF SYSTEMS:        Constitutional negative   Eyes negative   Ears/Nose/Mouth/Throat negative   Cardiovascular negative   Respiratory negative   Gastrointestinal negative   Genitourinary negative   Muscular negative   Integumentary negative   Neurological negative   Endocrine negative   Hematologic/Lymphatic negative       PSYCHIATRIC EXAMINATION/MENTAL STATUS  There were no vitals taken for this visit.  Participation: Active verbal participation  Grooming:Casual  Orientation: Fully  Oriented  Eye contact: Good  Behavior:Calm   Mood: Depressed  Affect: Constricted  Thought process: Logical  Thought content:  Within normal limits  Speech: Rate within normal limits  Perception:  Within normal limits  Memory:  No gross evidence of memory deficits  Insight: Good  Judgment: Good      Current risk:    Suicide: Low   Homicide: Low   Self-harm: Low  Relapse: Low  Other:   Crisis Safety Plan reviewed?Yes    Medical Records/Labs/Diagnostic Tests Reviewed: none from 2017    Medical Records/Labs/Diagnostic Tests Ordered: CBC/CMP/LIPIDS/TSH and Free T4/Vit D/Vit B12 ordered today        ASSESSMENT:   53 y/o male with long hx of Depression and Anxiety presenting for follow-up visit. Although patient appears to have physical symptoms of depression, his sleep is not effected at this time and currently denies suicidal and homicidal ideation. Will rerial low dose abilify for refractory depression and consider restarting low dose zoloft if patients depression worsens ( high dose zoloft causing sexual dysfunction). Patient educated [facility] as [facility] has [facility] 5 years in the  with [facility] discharged and currently has his  at home.    #Major Depressive Disorder  #JAY JAY- general anxiety disorder with panic attack    -r/o substance induced anxiety disorder (Risk factors: patient drinks 3 cups of coffee/day (down from 6 cups/day 1 month ago),  smokes 1PPD)      PLAN:  -retrial Abilify 5mg PO QDaily for refractory depression ( start with 1/2 tab x 1 week)  -trial propranolol 10mg PO BID for anxiety.  -Established new PCP with [facility], [facility] be [facility], [facility] ([facility] discharge- Not [facility])  -f/u in 3 weeks with next provider.       ------------------------------------  STOPPED MEDICATIONS:  -d/c . Zoloft  200mg Daily for depression/anxiety due to sexual dysfunction    Maged Lopez M.D.

## 2018-07-06 ENCOUNTER — TELEPHONE (OUTPATIENT)
Dept: BEHAVIORAL HEALTH | Facility: PHYSICIAN GROUP | Age: 53
End: 2018-07-06

## 2018-10-04 ENCOUNTER — HOSPITAL ENCOUNTER (OUTPATIENT)
Dept: LAB | Facility: MEDICAL CENTER | Age: 53
End: 2018-10-04
Attending: PHYSICIAN ASSISTANT
Payer: COMMERCIAL

## 2018-10-04 DIAGNOSIS — Z00.00 ANNUAL PHYSICAL EXAM: ICD-10-CM

## 2018-10-04 LAB
ALBUMIN SERPL BCP-MCNC: 4.6 G/DL (ref 3.2–4.9)
ALBUMIN/GLOB SERPL: 1.6 G/DL
ALP SERPL-CCNC: 55 U/L (ref 30–99)
ALT SERPL-CCNC: 32 U/L (ref 2–50)
ANION GAP SERPL CALC-SCNC: 8 MMOL/L (ref 0–11.9)
AST SERPL-CCNC: 20 U/L (ref 12–45)
BILIRUB SERPL-MCNC: 0.6 MG/DL (ref 0.1–1.5)
BUN SERPL-MCNC: 18 MG/DL (ref 8–22)
CALCIUM SERPL-MCNC: 9.3 MG/DL (ref 8.5–10.5)
CHLORIDE SERPL-SCNC: 110 MMOL/L (ref 96–112)
CHOLEST SERPL-MCNC: 201 MG/DL (ref 100–199)
CO2 SERPL-SCNC: 21 MMOL/L (ref 20–33)
CREAT SERPL-MCNC: 1.09 MG/DL (ref 0.5–1.4)
EST. AVERAGE GLUCOSE BLD GHB EST-MCNC: 108 MG/DL
FASTING STATUS PATIENT QL REPORTED: NORMAL
GLOBULIN SER CALC-MCNC: 2.8 G/DL (ref 1.9–3.5)
GLUCOSE SERPL-MCNC: 87 MG/DL (ref 65–99)
HBA1C MFR BLD: 5.4 % (ref 0–5.6)
HDLC SERPL-MCNC: 32 MG/DL
LDLC SERPL CALC-MCNC: 144 MG/DL
POTASSIUM SERPL-SCNC: 3.9 MMOL/L (ref 3.6–5.5)
PROT SERPL-MCNC: 7.4 G/DL (ref 6–8.2)
PSA SERPL-MCNC: 0.44 NG/ML (ref 0–4)
SODIUM SERPL-SCNC: 139 MMOL/L (ref 135–145)
TRIGL SERPL-MCNC: 127 MG/DL (ref 0–149)

## 2018-10-04 PROCEDURE — 84153 ASSAY OF PSA TOTAL: CPT

## 2018-10-04 PROCEDURE — 36415 COLL VENOUS BLD VENIPUNCTURE: CPT

## 2018-10-04 PROCEDURE — 80053 COMPREHEN METABOLIC PANEL: CPT

## 2018-10-04 PROCEDURE — 83036 HEMOGLOBIN GLYCOSYLATED A1C: CPT

## 2018-10-04 PROCEDURE — 80061 LIPID PANEL: CPT

## 2018-10-05 ENCOUNTER — TELEPHONE (OUTPATIENT)
Dept: MEDICAL GROUP | Facility: MEDICAL CENTER | Age: 53
End: 2018-10-05

## 2018-10-05 NOTE — TELEPHONE ENCOUNTER
Phone Number Called: [phone] (home)      Message: Left message for patient too call us back.     Left Message for patient to call back: yes

## 2018-10-05 NOTE — TELEPHONE ENCOUNTER
----- Message from Tello Mendoza P.A.-C. sent at 10/5/2018  7:02 AM PDT -----  Please contact.  Cholesterol is elevated.  Other labs are normal.  Should return to discuss.  Needs to exercise more routinely and eat less fatty foods.  Needs to repeat labs in 6 months.  Thank you.    Tello

## 2018-10-12 NOTE — TELEPHONE ENCOUNTER
Phone Number Called: [phone] (home)      Message: Left message for patient to call us back for results.     Left Message for patient to call back: yes

## 2018-11-10 ENCOUNTER — OFFICE VISIT (OUTPATIENT)
Dept: URGENT CARE | Facility: CLINIC | Age: 53
End: 2018-11-10
Payer: COMMERCIAL

## 2018-11-10 VITALS
HEART RATE: 86 BPM | HEIGHT: 70 IN | DIASTOLIC BLOOD PRESSURE: 60 MMHG | BODY MASS INDEX: 24.34 KG/M2 | WEIGHT: 170 LBS | OXYGEN SATURATION: 98 % | TEMPERATURE: 98.6 F | RESPIRATION RATE: 16 BRPM | SYSTOLIC BLOOD PRESSURE: 110 MMHG

## 2018-11-10 DIAGNOSIS — Z71.6 TOBACCO ABUSE COUNSELING: ICD-10-CM

## 2018-11-10 DIAGNOSIS — J02.9 PHARYNGITIS, UNSPECIFIED ETIOLOGY: ICD-10-CM

## 2018-11-10 LAB
INT CON NEG: NEGATIVE
INT CON POS: POSITIVE
S PYO AG THROAT QL: NEGATIVE

## 2018-11-10 PROCEDURE — 87880 STREP A ASSAY W/OPTIC: CPT | Performed by: NURSE PRACTITIONER

## 2018-11-10 PROCEDURE — 99406 BEHAV CHNG SMOKING 3-10 MIN: CPT | Performed by: NURSE PRACTITIONER

## 2018-11-10 PROCEDURE — 99213 OFFICE O/P EST LOW 20 MIN: CPT | Mod: 25 | Performed by: NURSE PRACTITIONER

## 2018-11-10 RX ORDER — AMOXICILLIN 500 MG/1
500 CAPSULE ORAL 2 TIMES DAILY
Qty: 20 CAP | Refills: 0 | Status: CANCELLED | OUTPATIENT
Start: 2018-11-10 | End: 2018-11-20

## 2018-11-10 ASSESSMENT — ENCOUNTER SYMPTOMS
CHILLS: 1
DIZZINESS: 0
COUGH: 0
FEVER: 0
NECK PAIN: 0
NAUSEA: 0
SWOLLEN GLANDS: 1
TROUBLE SWALLOWING: 0
HEADACHES: 0
SORE THROAT: 1
MYALGIAS: 0
VOMITING: 0
EYE PAIN: 0
SHORTNESS OF BREATH: 0

## 2018-11-10 NOTE — PROGRESS NOTES
Subjective:     Bruno Rai is a 53 y.o. male who presents for Pharyngitis (exposure to strep )       Pharyngitis    This is a new problem. The current episode started yesterday. The problem has been unchanged. Neither side of throat is experiencing more pain than the other. There has been no fever. The pain is at a severity of 6/10. The pain is moderate. Associated symptoms include swollen glands. Pertinent negatives include no congestion, coughing, ear pain, headaches, neck pain, shortness of breath, trouble swallowing or vomiting. He has had exposure to strep. He has tried nothing for the symptoms. The treatment provided no relief.     Past Medical History:   Diagnosis Date   • Dental disorder     upper and lower dentures   • Heart burn    • Indigestion    • Psychiatric problem     depression,anxiety     Past Surgical History:   Procedure Laterality Date   • BIOPSY GENERAL N/A 1/8/2016    Procedure: BIOPSY GENERAL PENILE;  Surgeon: Abel Kee M.D.;  Location: SURGERY [facility];  Service:    • OTHER      vasectomy   • VASECTOMY       Social History     Social History   • Marital status:      Spouse name: N/A   • Number of children: N/A   • Years of education: N/A     Occupational History   • Not on file.     Social History Main Topics   • Smoking status: Current Every Day Smoker     Packs/day: 1.00     Years: 30.00     Types: Cigarettes   • Smokeless tobacco: Never Used      Comment: patient just bought the nicorette patch   • Alcohol use Yes      Comment: Intermittent   • Drug use: No   • Sexual activity: Not Currently     Other Topics Concern   • Not on file     Social History Narrative   • No narrative on file      Family History   Problem Relation Age of Onset   • Anxiety disorder Mother    • Cancer Mother         Thyroid and possible colon CA   • Depression Father    • Depression Sister    • Depression Brother    • No Known Problems Brother     Review of Systems   Constitutional:  "Positive for chills. Negative for fever.   HENT: Positive for sore throat. Negative for congestion, ear pain and trouble swallowing.    Eyes: Negative for pain.   Respiratory: Negative for cough and shortness of breath.    Cardiovascular: Negative for chest pain.   Gastrointestinal: Negative for nausea and vomiting.   Genitourinary: Negative for hematuria.   Musculoskeletal: Negative for myalgias and neck pain.   Skin: Negative for rash.   Neurological: Negative for dizziness and headaches.   No Known Allergies   Objective:   /60   Pulse 86   Temp 37 °C (98.6 °F)   Resp 16   Ht 1.778 m (5' 10\")   Wt 77.1 kg (170 lb)   SpO2 98%   BMI 24.39 kg/m²   Physical Exam   Constitutional: He is oriented to person, place, and time. He appears well-developed and well-nourished. No distress.   HENT:   Head: Normocephalic and atraumatic.   Right Ear: Tympanic membrane normal.   Left Ear: Tympanic membrane normal.   Nose: Nose normal. Right sinus exhibits no maxillary sinus tenderness and no frontal sinus tenderness. Left sinus exhibits no maxillary sinus tenderness and no frontal sinus tenderness.   Mouth/Throat: Uvula is midline and mucous membranes are normal. Posterior oropharyngeal edema and posterior oropharyngeal erythema present. No tonsillar abscesses. No tonsillar exudate.   Eyes: Pupils are equal, round, and reactive to light. Conjunctivae and EOM are normal. Right eye exhibits no discharge. Left eye exhibits no discharge.   Cardiovascular: Normal rate and regular rhythm.    No murmur heard.  Pulmonary/Chest: Effort normal and breath sounds normal. No respiratory distress.   Abdominal: Soft. He exhibits no distension. There is no tenderness.   Lymphadenopathy:     He has cervical adenopathy.   Neurological: He is alert and oriented to person, place, and time. He has normal reflexes. No sensory deficit.   Skin: Skin is warm, dry and intact.   Psychiatric: He has a normal mood and affect.       "   Assessment/Plan:   Assessment    1. Tobacco abuse counseling  REFERRAL TO TOBACCO CESSATION PROGRAM   2. Pharyngitis, unspecified etiology  POCT Rapid Strep A     Strep negative  It was explained to the pt. Today that due to the viral nature of the pt's illness, we will treat symptomatically today. Encouraged OTC supportive meds PRN. Humidification, increase fluids    Smoking cessation was discussed today for longer than 3 min. OTC Smoking cessation aids were discussed and pt. will consider. Referral has been placed.     Patient given precautionary s/sx that mandate immediate follow up and evaluation in the ED. Advised of risks of not doing so.    DDX, Supportive care, and indications for immediate follow-up discussed with patient.    Instructed to return to clinic or nearest emergency department if we are not available for any change in condition, further concerns, or worsening of symptoms.    The patient demonstrated a good understanding and agreed with the treatment plan.

## 2020-04-01 ENCOUNTER — OFFICE VISIT (OUTPATIENT)
Dept: URGENT CARE | Facility: CLINIC | Age: 55
End: 2020-04-01
Payer: COMMERCIAL

## 2020-04-01 VITALS
WEIGHT: 173.4 LBS | TEMPERATURE: 99.2 F | DIASTOLIC BLOOD PRESSURE: 78 MMHG | OXYGEN SATURATION: 95 % | SYSTOLIC BLOOD PRESSURE: 116 MMHG | HEIGHT: 69 IN | BODY MASS INDEX: 25.68 KG/M2 | HEART RATE: 78 BPM | RESPIRATION RATE: 14 BRPM

## 2020-04-01 DIAGNOSIS — J98.8 RTI (RESPIRATORY TRACT INFECTION): ICD-10-CM

## 2020-04-01 LAB
FLUAV+FLUBV AG SPEC QL IA: NEGATIVE
INT CON NEG: NEGATIVE
INT CON POS: POSITIVE

## 2020-04-01 PROCEDURE — 99214 OFFICE O/P EST MOD 30 MIN: CPT | Performed by: FAMILY MEDICINE

## 2020-04-01 PROCEDURE — 87804 INFLUENZA ASSAY W/OPTIC: CPT | Performed by: FAMILY MEDICINE

## 2020-04-01 ASSESSMENT — ENCOUNTER SYMPTOMS
FEVER: 1
COUGH: 1
CHILLS: 1

## 2020-04-01 NOTE — PROGRESS NOTES
"Subjective:      Bruno Rai is a 55 y.o. male who presents with Nasal Congestion (x 2 days.  Nasal congestion, cough, chills and fever.  )      - This is a pleasant and non toxic appearing 55 y.o. male with c/o yesterday woke up and felt warm (no recorded fever), had a little sneezing. No new cough or stuffy nose. No CP/SOB            ALLERGIES:  Patient has no known allergies.     PMH:  Past Medical History:   Diagnosis Date   • Dental disorder     upper and lower dentures   • Heart burn    • Indigestion    • Psychiatric problem     depression,anxiety        PSH:  Past Surgical History:   Procedure Laterality Date   • BIOPSY GENERAL N/A 1/8/2016    Procedure: BIOPSY GENERAL PENILE;  Surgeon: Abel Kee M.D.;  Location: SURGERY [facility];  Service:    • OTHER      vasectomy   • VASECTOMY         MEDS:    Current Outpatient Medications:   •  aripiprazole (ABILIFY) 5 MG tablet, Take 1 Tab by mouth every day., Disp: 30 Tab, Rfl: 1  •  propranolol (INDERAL) 10 MG Tab, Take 1 Tab by mouth 3 times a day., Disp: 90 Tab, Rfl: 2    ** I have documented what I find to be significant in regards to past medical, social, family and surgical history  in my HPI or under PMH/PSH/FH review section, otherwise it is contributory **           HPI    Review of Systems   Constitutional: Positive for chills, fever and malaise/fatigue.   HENT: Positive for congestion.    Respiratory: Positive for cough.    All other systems reviewed and are negative.         Objective:     /78   Pulse 78   Temp 37.3 °C (99.2 °F) (Temporal)   Resp 14   Ht 1.759 m (5' 9.25\")   Wt 78.7 kg (173 lb 6.4 oz)   SpO2 95%   BMI 25.42 kg/m²      Physical Exam  Vitals signs and nursing note reviewed.   Constitutional:       General: He is not in acute distress.     Appearance: He is well-developed. He is not diaphoretic.   HENT:      Head: Normocephalic and atraumatic.      Nose: No congestion or rhinorrhea.      Mouth/Throat:      " Mouth: Mucous membranes are moist.      Pharynx: Oropharynx is clear. No oropharyngeal exudate or posterior oropharyngeal erythema.   Eyes:      Conjunctiva/sclera: Conjunctivae normal.   Cardiovascular:      Heart sounds: Normal heart sounds. No murmur.   Pulmonary:      Effort: Pulmonary effort is normal. No respiratory distress.      Breath sounds: Normal breath sounds. No wheezing, rhonchi or rales.   Skin:     General: Skin is warm and dry.   Neurological:      Mental Status: He is alert.      Motor: No abnormal muscle tone.   Psychiatric:         Judgment: Judgment normal.                 Assessment/Plan:           1. RTI (respiratory tract infection)  POCT Influenza A/B       - rest  - E.R. precautions discussed     Dx & d/c instructions discussed w/ patient and/or family members.     Follow up with PCP (or UC if PCP is unavailable) in 2-3 days to make sure improving and no further additional treatment needed, ER if not improving or feeling/getting worse.    Any realistic and/or common medication side effects that may have been given today(i.e. Rash, GI upset/constipation, sedation, elevation of BP or blood sugars) reviewed.     Patient left in stable condition      reviewed if narcotics given

## 2020-04-01 NOTE — LETTER
April 1, 2020         Patient: Bruno Rai   YOB: 1965   Date of Visit: 4/1/2020           To Whom it May Concern:    Bruno Rai was seen in my clinic on 4/1/2020. He may return to work in 2-3 days.    If you have any questions or concerns, please don't hesitate to call.        Sincerely,           Vishnu Cross M.D.  Electronically Signed

## 2020-09-02 ENCOUNTER — HOSPITAL ENCOUNTER (OUTPATIENT)
Dept: LAB | Facility: MEDICAL CENTER | Age: 55
End: 2020-09-02
Attending: FAMILY MEDICINE
Payer: COMMERCIAL

## 2020-09-02 LAB
ALBUMIN SERPL BCP-MCNC: 4.8 G/DL (ref 3.2–4.9)
ALBUMIN/GLOB SERPL: 1.9 G/DL
ALP SERPL-CCNC: 65 U/L (ref 30–99)
ALT SERPL-CCNC: 52 U/L (ref 2–50)
ANION GAP SERPL CALC-SCNC: 13 MMOL/L (ref 7–16)
AST SERPL-CCNC: 34 U/L (ref 12–45)
BASOPHILS # BLD AUTO: 0.9 % (ref 0–1.8)
BASOPHILS # BLD: 0.08 K/UL (ref 0–0.12)
BILIRUB SERPL-MCNC: 0.5 MG/DL (ref 0.1–1.5)
BUN SERPL-MCNC: 24 MG/DL (ref 8–22)
CALCIUM SERPL-MCNC: 9.6 MG/DL (ref 8.5–10.5)
CHLORIDE SERPL-SCNC: 104 MMOL/L (ref 96–112)
CHOLEST SERPL-MCNC: 196 MG/DL (ref 100–199)
CO2 SERPL-SCNC: 22 MMOL/L (ref 20–33)
CREAT SERPL-MCNC: 1.08 MG/DL (ref 0.5–1.4)
EOSINOPHIL # BLD AUTO: 0.36 K/UL (ref 0–0.51)
EOSINOPHIL NFR BLD: 4.2 % (ref 0–6.9)
ERYTHROCYTE [DISTWIDTH] IN BLOOD BY AUTOMATED COUNT: 41.7 FL (ref 35.9–50)
FASTING STATUS PATIENT QL REPORTED: NORMAL
GLOBULIN SER CALC-MCNC: 2.5 G/DL (ref 1.9–3.5)
GLUCOSE SERPL-MCNC: 97 MG/DL (ref 65–99)
HCT VFR BLD AUTO: 47.7 % (ref 42–52)
HDLC SERPL-MCNC: 39 MG/DL
HGB BLD-MCNC: 16 G/DL (ref 14–18)
IMM GRANULOCYTES # BLD AUTO: 0.02 K/UL (ref 0–0.11)
IMM GRANULOCYTES NFR BLD AUTO: 0.2 % (ref 0–0.9)
LDLC SERPL CALC-MCNC: 144 MG/DL
LYMPHOCYTES # BLD AUTO: 2.78 K/UL (ref 1–4.8)
LYMPHOCYTES NFR BLD: 32.1 % (ref 22–41)
MCH RBC QN AUTO: 31.3 PG (ref 27–33)
MCHC RBC AUTO-ENTMCNC: 33.5 G/DL (ref 33.7–35.3)
MCV RBC AUTO: 93.2 FL (ref 81.4–97.8)
MONOCYTES # BLD AUTO: 0.91 K/UL (ref 0–0.85)
MONOCYTES NFR BLD AUTO: 10.5 % (ref 0–13.4)
NEUTROPHILS # BLD AUTO: 4.5 K/UL (ref 1.82–7.42)
NEUTROPHILS NFR BLD: 52.1 % (ref 44–72)
NRBC # BLD AUTO: 0 K/UL
NRBC BLD-RTO: 0 /100 WBC
PLATELET # BLD AUTO: 256 K/UL (ref 164–446)
PMV BLD AUTO: 10.6 FL (ref 9–12.9)
POTASSIUM SERPL-SCNC: 3.9 MMOL/L (ref 3.6–5.5)
PROT SERPL-MCNC: 7.3 G/DL (ref 6–8.2)
RBC # BLD AUTO: 5.12 M/UL (ref 4.7–6.1)
SODIUM SERPL-SCNC: 139 MMOL/L (ref 135–145)
TRIGL SERPL-MCNC: 64 MG/DL (ref 0–149)
WBC # BLD AUTO: 8.7 K/UL (ref 4.8–10.8)

## 2020-09-02 PROCEDURE — 80053 COMPREHEN METABOLIC PANEL: CPT

## 2020-09-02 PROCEDURE — 80061 LIPID PANEL: CPT

## 2020-09-02 PROCEDURE — 85025 COMPLETE CBC W/AUTO DIFF WBC: CPT

## 2020-09-02 PROCEDURE — 36415 COLL VENOUS BLD VENIPUNCTURE: CPT

## 2020-09-11 ENCOUNTER — HOSPITAL ENCOUNTER (OUTPATIENT)
Dept: RADIOLOGY | Facility: MEDICAL CENTER | Age: 55
End: 2020-09-11
Attending: FAMILY MEDICINE
Payer: COMMERCIAL

## 2020-09-11 ENCOUNTER — APPOINTMENT (OUTPATIENT)
Dept: RADIOLOGY | Facility: MEDICAL CENTER | Age: 55
End: 2020-09-11
Attending: EMERGENCY MEDICINE
Payer: COMMERCIAL

## 2020-09-11 ENCOUNTER — OFFICE VISIT (OUTPATIENT)
Dept: URGENT CARE | Facility: PHYSICIAN GROUP | Age: 55
End: 2020-09-11
Payer: COMMERCIAL

## 2020-09-11 ENCOUNTER — HOSPITAL ENCOUNTER (EMERGENCY)
Facility: MEDICAL CENTER | Age: 55
End: 2020-09-11
Attending: EMERGENCY MEDICINE
Payer: COMMERCIAL

## 2020-09-11 VITALS
OXYGEN SATURATION: 98 % | HEART RATE: 69 BPM | RESPIRATION RATE: 17 BRPM | SYSTOLIC BLOOD PRESSURE: 140 MMHG | WEIGHT: 178 LBS | BODY MASS INDEX: 25.48 KG/M2 | HEIGHT: 70 IN | TEMPERATURE: 98.1 F | DIASTOLIC BLOOD PRESSURE: 92 MMHG

## 2020-09-11 VITALS
HEART RATE: 56 BPM | BODY MASS INDEX: 25.69 KG/M2 | SYSTOLIC BLOOD PRESSURE: 138 MMHG | HEIGHT: 70 IN | WEIGHT: 179.45 LBS | OXYGEN SATURATION: 96 % | DIASTOLIC BLOOD PRESSURE: 83 MMHG | RESPIRATION RATE: 13 BRPM | TEMPERATURE: 98.4 F

## 2020-09-11 DIAGNOSIS — R07.9 CHEST PAIN, UNSPECIFIED TYPE: ICD-10-CM

## 2020-09-11 DIAGNOSIS — R06.00 DYSPNEA, PAROXYSMAL NOCTURNAL: ICD-10-CM

## 2020-09-11 DIAGNOSIS — R10.13 EPIGASTRIC PAIN: ICD-10-CM

## 2020-09-11 DIAGNOSIS — T14.8XXA MUSCLE STRAIN: ICD-10-CM

## 2020-09-11 DIAGNOSIS — R06.09 PLATYPNEA-ORTHODEOXIA SYNDROME: ICD-10-CM

## 2020-09-11 DIAGNOSIS — M54.9 ACUTE BACK PAIN, UNSPECIFIED BACK LOCATION, UNSPECIFIED BACK PAIN LATERALITY: ICD-10-CM

## 2020-09-11 LAB
ALBUMIN SERPL BCP-MCNC: 4.4 G/DL (ref 3.2–4.9)
ALBUMIN/GLOB SERPL: 1.7 G/DL
ALP SERPL-CCNC: 65 U/L (ref 30–99)
ALT SERPL-CCNC: 40 U/L (ref 2–50)
ANION GAP SERPL CALC-SCNC: 14 MMOL/L (ref 7–16)
AST SERPL-CCNC: 19 U/L (ref 12–45)
BASOPHILS # BLD AUTO: 0.8 % (ref 0–1.8)
BASOPHILS # BLD: 0.08 K/UL (ref 0–0.12)
BILIRUB SERPL-MCNC: 0.2 MG/DL (ref 0.1–1.5)
BUN SERPL-MCNC: 24 MG/DL (ref 8–22)
CALCIUM SERPL-MCNC: 9.2 MG/DL (ref 8.5–10.5)
CHLORIDE SERPL-SCNC: 105 MMOL/L (ref 96–112)
CO2 SERPL-SCNC: 20 MMOL/L (ref 20–33)
CREAT SERPL-MCNC: 1 MG/DL (ref 0.5–1.4)
EKG IMPRESSION: NORMAL
EOSINOPHIL # BLD AUTO: 0.35 K/UL (ref 0–0.51)
EOSINOPHIL NFR BLD: 3.5 % (ref 0–6.9)
ERYTHROCYTE [DISTWIDTH] IN BLOOD BY AUTOMATED COUNT: 39.9 FL (ref 35.9–50)
GLOBULIN SER CALC-MCNC: 2.6 G/DL (ref 1.9–3.5)
GLUCOSE SERPL-MCNC: 93 MG/DL (ref 65–99)
HCT VFR BLD AUTO: 46.3 % (ref 42–52)
HGB BLD-MCNC: 15.5 G/DL (ref 14–18)
IMM GRANULOCYTES # BLD AUTO: 0.03 K/UL (ref 0–0.11)
IMM GRANULOCYTES NFR BLD AUTO: 0.3 % (ref 0–0.9)
LYMPHOCYTES # BLD AUTO: 3.21 K/UL (ref 1–4.8)
LYMPHOCYTES NFR BLD: 32.1 % (ref 22–41)
MCH RBC QN AUTO: 31 PG (ref 27–33)
MCHC RBC AUTO-ENTMCNC: 33.5 G/DL (ref 33.7–35.3)
MCV RBC AUTO: 92.6 FL (ref 81.4–97.8)
MONOCYTES # BLD AUTO: 0.82 K/UL (ref 0–0.85)
MONOCYTES NFR BLD AUTO: 8.2 % (ref 0–13.4)
NEUTROPHILS # BLD AUTO: 5.51 K/UL (ref 1.82–7.42)
NEUTROPHILS NFR BLD: 55.1 % (ref 44–72)
NRBC # BLD AUTO: 0 K/UL
NRBC BLD-RTO: 0 /100 WBC
PLATELET # BLD AUTO: 219 K/UL (ref 164–446)
PMV BLD AUTO: 10.2 FL (ref 9–12.9)
POTASSIUM SERPL-SCNC: 4.2 MMOL/L (ref 3.6–5.5)
PROT SERPL-MCNC: 7 G/DL (ref 6–8.2)
RBC # BLD AUTO: 5 M/UL (ref 4.7–6.1)
SODIUM SERPL-SCNC: 139 MMOL/L (ref 135–145)
TROPONIN T SERPL-MCNC: <6 NG/L (ref 6–19)
WBC # BLD AUTO: 10 K/UL (ref 4.8–10.8)

## 2020-09-11 PROCEDURE — 99283 EMERGENCY DEPT VISIT LOW MDM: CPT

## 2020-09-11 PROCEDURE — 99214 OFFICE O/P EST MOD 30 MIN: CPT | Performed by: NURSE PRACTITIONER

## 2020-09-11 PROCEDURE — A9270 NON-COVERED ITEM OR SERVICE: HCPCS | Performed by: EMERGENCY MEDICINE

## 2020-09-11 PROCEDURE — 80053 COMPREHEN METABOLIC PANEL: CPT

## 2020-09-11 PROCEDURE — 84484 ASSAY OF TROPONIN QUANT: CPT

## 2020-09-11 PROCEDURE — 93005 ELECTROCARDIOGRAM TRACING: CPT | Performed by: EMERGENCY MEDICINE

## 2020-09-11 PROCEDURE — 93000 ELECTROCARDIOGRAM COMPLETE: CPT | Performed by: NURSE PRACTITIONER

## 2020-09-11 PROCEDURE — 71046 X-RAY EXAM CHEST 2 VIEWS: CPT

## 2020-09-11 PROCEDURE — 700102 HCHG RX REV CODE 250 W/ 637 OVERRIDE(OP): Performed by: EMERGENCY MEDICINE

## 2020-09-11 PROCEDURE — 85025 COMPLETE CBC W/AUTO DIFF WBC: CPT

## 2020-09-11 RX ORDER — ASPIRIN 325 MG
325 TABLET ORAL ONCE
Status: COMPLETED | OUTPATIENT
Start: 2020-09-11 | End: 2020-09-11

## 2020-09-11 RX ADMIN — ASPIRIN 325 MG: 325 TABLET, FILM COATED ORAL at 13:12

## 2020-09-11 ASSESSMENT — ENCOUNTER SYMPTOMS
HEADACHES: 0
CHILLS: 0
DOUBLE VISION: 0
HEARTBURN: 0
FEVER: 0
CONSTIPATION: 0
COUGH: 0
BLURRED VISION: 0
DIAPHORESIS: 0
PALPITATIONS: 0
ORTHOPNEA: 0
TINGLING: 0
SORE THROAT: 0
BACK PAIN: 0
SHORTNESS OF BREATH: 0
VOMITING: 0
NAUSEA: 0
ABDOMINAL PAIN: 1
DIZZINESS: 0
DIARRHEA: 0
MYALGIAS: 0
WHEEZING: 0
MEMORY LOSS: 0

## 2020-09-11 ASSESSMENT — FIBROSIS 4 INDEX
FIB4 SCORE: 0.99
FIB4 SCORE: 0.99

## 2020-09-11 ASSESSMENT — PAIN SCALES - GENERAL: PAINLEVEL: 6=MODERATE PAIN

## 2020-09-11 NOTE — PROGRESS NOTES
"Subjective:      Bruno Rai is a 55 y.o. male who presents with Back Pain (Mid back)          Patient presents to urgent care with complaint of substernal chest pain, back and abdominal pain which started yesterday morning and worsened throughout the day.  He states today he only feels mid-back pain which he describes as \"pressure\". Patient is bent over exam table in room.  Patient has no known mechanism of injury. He has no tenderness on palpation.  Patient reports he was at his primary care doctor the other day and had an EKG done which he states showed he had a \"prior heart attack\" and he is undergoing further cardiac work-up with a stress test next week. That EKG is unavailable for review however states PCP said he had \"blips in his T waves\"   He denies any radiating arm pain, diaphoresis, nausea, shortness of breath.  He denies hx of kidney stones and has no urinary complaints today. Patient reports his father  at the age of 56 of a heart attack. Patient quit smoking 2 years ago.       Review of Systems   Constitutional: Negative for chills, diaphoresis, fever and malaise/fatigue.   HENT: Negative for ear pain and sore throat.    Eyes: Negative for blurred vision and double vision.   Respiratory: Negative for cough, shortness of breath and wheezing.    Cardiovascular: Positive for chest pain. Negative for palpitations, orthopnea and leg swelling.   Gastrointestinal: Positive for abdominal pain. Negative for constipation, diarrhea, heartburn, nausea and vomiting.   Genitourinary: Negative for urgency.   Musculoskeletal: Negative for back pain, joint pain and myalgias.   Skin: Negative for rash.   Neurological: Negative for dizziness, tingling and headaches.   Psychiatric/Behavioral: Negative for memory loss and suicidal ideas.   All other systems reviewed and are negative.         Objective:     /92   Pulse 69   Temp 36.7 °C (98.1 °F)   Resp 17   Ht 1.778 m (5' 10\")   Wt 80.7 kg (178 lb)  "  SpO2 98%   BMI 25.54 kg/m²      Physical Exam  Vitals signs reviewed.   Constitutional:       General: He is not in acute distress.     Appearance: Normal appearance. He is not ill-appearing or diaphoretic.   HENT:      Head: Normocephalic.      Right Ear: External ear normal.      Left Ear: External ear normal.      Nose: Nose normal. No congestion.      Mouth/Throat:      Mouth: Mucous membranes are moist.   Eyes:      Extraocular Movements: Extraocular movements intact.      Conjunctiva/sclera: Conjunctivae normal.   Neck:      Musculoskeletal: Normal range of motion and neck supple.   Cardiovascular:      Rate and Rhythm: Normal rate and regular rhythm.      Pulses: Normal pulses.      Heart sounds: Normal heart sounds. No murmur.   Pulmonary:      Effort: Pulmonary effort is normal. No respiratory distress.      Breath sounds: Normal breath sounds. No wheezing, rhonchi or rales.   Abdominal:      General: Abdomen is flat. Bowel sounds are normal.      Palpations: Abdomen is soft.      Tenderness: There is no abdominal tenderness.   Musculoskeletal: Normal range of motion.   Lymphadenopathy:      Cervical: No cervical adenopathy.   Skin:     General: Skin is warm and dry.      Capillary Refill: Capillary refill takes less than 2 seconds.   Neurological:      Mental Status: He is alert and oriented to person, place, and time.   Psychiatric:         Mood and Affect: Mood normal.         Behavior: Behavior normal.                     Assessment/Plan:     1. Chest pain, unspecified type  EKG   2. Acute back pain, unspecified back location, unspecified back pain laterality     3. Epigastric pain       Discussed differentials with patient including aortic dissection, MI, nephrolithiasis, panic attack.  At this time, I feel the patient requires a higher level of care including closer monitoring, stat lab work and/or imaging for further evaluation for complaints of chest pain, back pain and abdominal pain. This has  been discussed with the patient and they state agreement and understanding.  I offered the patient an ambulance ride an  the patient is declining at this time. The patient is in no acute distress upon clinic departure and will go directly to ED without delay.

## 2020-09-11 NOTE — ED NOTES
"Pt came back from triage, told to come to ED by  provider.     Pt states he went to  today d/t back pain that feels like a \"muscle ache.\" denies any recent injury or trauma. Pt denies any CP but states some mild epigastric pain that started yesterday, now denies. States he has a stress test ordered in a couple days d/t abnormal EKG that was done 10 days ago.     Pt denies any SOB/nausea/dizziness. A/o x4, speaking in full sentences.   "

## 2020-09-11 NOTE — ED PROVIDER NOTES
ED Provider Note    Scribed for Rasta Garcia M.D. by Elizabeth Lilly. 9/11/2020, 12:58 PM.    Primary care provider: Neri Clemente M.D.  Means of arrival: Walk-In  History obtained from: Patient  History limited by: None    CHIEF COMPLAINT  Chief Complaint   Patient presents with   • Sent from Urgent Care     this morning, reports EKG abnormal at PCP and set up for stress test.  went to  d/t waking up with back pain.  chest pains two days ago.    • Back Pain     lower back pain, denies trauma     HPI  Bruno Rai is a 55 y.o. male who presents to the Emergency Department after being sent by [facility] today for further evaluation of mid lower back pain. Patient states he quit smoking 2 years ago and will intermittently feel short of breath. He followed-up with his PCP last Tuesday on 9/1 who prescribed him Trilagene and performed an EKG indicating some abnormality. He was scheduled for a stress test for 9/16 which he has yet to do. 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 He admits that he did throw some parcels while at work that may have caused his pain. He proceeded to wake up with pain this morning that is been continuous since all day yesterday and had a repeat EKG performed at an urgent care today and because of his history and potential work-up for cardiac problems the patient was sent to the emerge department for evaluation.  Patient states that he feels like it is is muscle primarily in his back denies any shortness of breath denies fever chills nausea vomiting or any other symptoms.. At that time, he was sent here for further evaluation and blood work. Denies nausea. Admits to feeling at baseline currently. Patient was placed on a statin but is not on Aspirin.     REVIEW OF SYSTEMS  As above otherwise all other systems are  "negative    PAST MEDICAL HISTORY  Patient has a past medical history of Dental disorder, Heart burn, Indigestion, and Psychiatric problem.    SURGICAL HISTORY  Patient has a past surgical history that includes other; biopsy general (N/A, 2016); and vasectomy.    SOCIAL HISTORY  Social History     Tobacco Use   • Smoking status: Former Smoker     Packs/day: 1.00     Years: 30.00     Pack years: 30.00     Types: Cigarettes     Quit date: 10/11/2018     Years since quittin.9   • Smokeless tobacco: Never Used   • Tobacco comment: patient just bought the nicorette patch   Substance Use Topics   • Alcohol use: Yes     Comment: Intermittent   • Drug use: No      Social History     Substance and Sexual Activity   Drug Use No       FAMILY HISTORY  Family History   Problem Relation Age of Onset   • Anxiety disorder Mother    • Cancer Mother         Thyroid and possible colon CA   • Depression Father    • Depression Sister    • Depression Brother    • No Known Problems Brother        CURRENT MEDICATIONS  Home Medications     Reviewed by Portia Balderas R.N. (Registered Nurse) on 20 at 1202  Med List Status: Partial   Medication Last Dose Status        Patient Mandeep Taking any Medications                       ALLERGIES  No Known Allergies    PHYSICAL EXAM  VITAL SIGNS: /83   Pulse 63   Temp 36.8 °C (98.2 °F) (Temporal)   Resp 14   Ht 1.778 m (5' 10\")   Wt 81.4 kg (179 lb 7.3 oz)   SpO2 97%   BMI 25.75 kg/m²     Constitutional: Well developed, Well nourished, No acute distress, Non-toxic appearance.   HENT: Normocephalic, Atraumatic, Bilateral external ears normal, oropharynx moist, No oral exudates, Nose normal.   Eyes:conjunctiva is normal, there are no signs of exudate.   Neck: Supple, no meningeal signs.  Lymphatic: No lymphadenopathy noted.   Cardiovascular: Regular rate and rhythm without murmurs gallops or rubs.   Thorax & Lungs: No respiratory distress. Breathing comfortably. Lungs are clear " to auscultation bilaterally, there are no wheezes no rales. Chest wall is nontender.  Abdomen: Soft, nontender, nondistended. Bowel sounds are present.   Skin: Warm, Dry, No erythema,   Back: Tenderness about the left, greater than right, paraspinal musculature at the thoracolumbar region.   Musculoskeletal: Negative Homans sign. Bilateral lower extremities 2+ pulses. Good range of motion in all major joints. No tenderness to palpation or major deformities noted. Intact distal pulses, no clubbing, no cyanosis, no edema,   Neurologic: Alert & oriented x 3, Moving all extremities. No gross abnormalities.    Psychiatric: Affect normal, Judgment normal, Mood normal.       LABS  Results for orders placed or performed during the hospital encounter of 09/11/20   CBC with Differential   Result Value Ref Range    WBC 10.0 4.8 - 10.8 K/uL    RBC 5.00 4.70 - 6.10 M/uL    Hemoglobin 15.5 14.0 - 18.0 g/dL    Hematocrit 46.3 42.0 - 52.0 %    MCV 92.6 81.4 - 97.8 fL    MCH 31.0 27.0 - 33.0 pg    MCHC 33.5 (L) 33.7 - 35.3 g/dL    RDW 39.9 35.9 - 50.0 fL    Platelet Count 219 164 - 446 K/uL    MPV 10.2 9.0 - 12.9 fL    Neutrophils-Polys 55.10 44.00 - 72.00 %    Lymphocytes 32.10 22.00 - 41.00 %    Monocytes 8.20 0.00 - 13.40 %    Eosinophils 3.50 0.00 - 6.90 %    Basophils 0.80 0.00 - 1.80 %    Immature Granulocytes 0.30 0.00 - 0.90 %    Nucleated RBC 0.00 /100 WBC    Neutrophils (Absolute) 5.51 1.82 - 7.42 K/uL    Lymphs (Absolute) 3.21 1.00 - 4.80 K/uL    Monos (Absolute) 0.82 0.00 - 0.85 K/uL    Eos (Absolute) 0.35 0.00 - 0.51 K/uL    Baso (Absolute) 0.08 0.00 - 0.12 K/uL    Immature Granulocytes (abs) 0.03 0.00 - 0.11 K/uL    NRBC (Absolute) 0.00 K/uL   Complete Metabolic Panel (CMP)   Result Value Ref Range    Sodium 139 135 - 145 mmol/L    Potassium 4.2 3.6 - 5.5 mmol/L    Chloride 105 96 - 112 mmol/L    Co2 20 20 - 33 mmol/L    Anion Gap 14.0 7.0 - 16.0    Glucose 93 65 - 99 mg/dL    Bun 24 (H) 8 - 22 mg/dL    Creatinine 1.00  0.50 - 1.40 mg/dL    Calcium 9.2 8.5 - 10.5 mg/dL    AST(SGOT) 19 12 - 45 U/L    ALT(SGPT) 40 2 - 50 U/L    Alkaline Phosphatase 65 30 - 99 U/L    Total Bilirubin 0.2 0.1 - 1.5 mg/dL    Albumin 4.4 3.2 - 4.9 g/dL    Total Protein 7.0 6.0 - 8.2 g/dL    Globulin 2.6 1.9 - 3.5 g/dL    A-G Ratio 1.7 g/dL   Troponin   Result Value Ref Range    Troponin T <6 6 - 19 ng/L   ESTIMATED GFR   Result Value Ref Range    GFR If African American >60 >60 mL/min/1.73 m 2    GFR If Non African American >60 >60 mL/min/1.73 m 2   EKG   Result Value Ref Range    Report       [facility] Emergency Dept.    Test Date:  2020  Pt Name:    JUAQUIN WALKER                Department: ER  MRN:        [id number]                      Room:       [facility]  Gender:     Male                         Technician: NARENDRA  :        1965                   Requested By:ER TRIAGE PROTOCOL  Order #:    [id number]                    Reading MD: LISET AJ MD    Measurements  Intervals                                Axis  Rate:       60                           P:          64  CO:         160                          QRS:        50  QRSD:       102                          T:          55  QT:         400  QTc:        400    Interpretive Statements  SINUS RHYTHM  RSR' IN V1 OR V2, PROBABLY NORMAL VARIANT  BASELINE WANDER IN LEAD(S) I,III,aVL  No previous ECG available for comparison  otherwise normal ECG  Electronically Signed On 2020 13:58:43 PDT by LISET AJ MD       All labs reviewed by me.    EKG  Interpreted by me as shown above.       COURSE & MEDICAL DECISION MAKING  Pertinent Labs & Imaging studies reviewed. (See chart for details)    12:58 PM - Patient seen and examined at bedside. Patient will be treated with  mg. Ordered DX-chest, CBC with differential, CMP, troponin, and EKG to evaluate his symptoms. The differential diagnoses include but are not limited to: musucular strain, ACS     2:00 PM -  Recheck. Updated patient on his lab results, see above. He will be discharged home at this time.     Decision Making:  Patient presents emerge department for evaluation.  Clinically the patient is tender about the thoracolumbar region of the paraspinous musculatures.  I do feels a low likelihood that this is acute coronary syndrome so initial troponin was obtained the patient did have a chest x-ray done earlier that I did review that appears normal.  Troponin is normal so at this point I feel the likelihood of this acute coronary syndrome is extremely low based on the symptomatology as well as the duration of symptoms which is much greater than 16 hours but less than 72 hours so I feel with a singular troponin the patient is stable for discharge to follow-up for outpatient evaluation I do feel that this is a muscular spasm and I feel the patient should follow-up as above.    The patient will return for new or worsening symptoms and is stable at the time of discharge.    The patient is referred to a primary physician for blood pressure management, diabetic screening, and for all other preventative health concerns.      DISPOSITION:  Patient will be discharged home in stable condition.    FOLLOW UP:  Neri Clemente M.D.  [address] [id number]  [phone]    Schedule an appointment as soon as possible for a visit in 1 week  For re-check, Return if any symptoms worsen      OUTPATIENT MEDICATIONS:  There are no discharge medications for this patient.        FINAL IMPRESSION  1. Muscle strain          Elizabeth RAMIREZ (Michi), am scribing for, and in the presence of, Rasta Garcia M.D..    Electronically signed by: Elizabeth Lilly (Michi), 9/11/2020    Rasta RAMIREZ M.D. personally performed the services described in this documentation, as scribed by Elizabeth Lilly in my presence, and it is both accurate and complete. C    The note accurately reflects work and decisions  made by me.  Rsata Garcia M.D.  9/11/2020  4:04 PM

## 2020-09-11 NOTE — ED TRIAGE NOTES
Pt ambulated to triage with   Chief Complaint   Patient presents with   • Sent from Urgent Care     this morning, reports EKG abnormal at PCP and set up for stress test.  went to  d/t waking up with back pain.  chest pains two days ago.    • Back Pain     lower back pain, denies trauma       EKG completed.   Pt Informed regarding triage process and verbalized understanding to inform triage tech or RN for any changes in condition. Placed in lobby.

## 2020-10-03 ENCOUNTER — OFFICE VISIT (OUTPATIENT)
Dept: URGENT CARE | Facility: CLINIC | Age: 55
End: 2020-10-03
Payer: COMMERCIAL

## 2020-10-03 VITALS
BODY MASS INDEX: 25.48 KG/M2 | OXYGEN SATURATION: 97 % | TEMPERATURE: 98.3 F | SYSTOLIC BLOOD PRESSURE: 130 MMHG | DIASTOLIC BLOOD PRESSURE: 90 MMHG | WEIGHT: 178 LBS | HEIGHT: 70 IN | RESPIRATION RATE: 16 BRPM | HEART RATE: 96 BPM

## 2020-10-03 DIAGNOSIS — M62.838 MUSCLE SPASM: ICD-10-CM

## 2020-10-03 PROCEDURE — 99214 OFFICE O/P EST MOD 30 MIN: CPT | Performed by: NURSE PRACTITIONER

## 2020-10-03 RX ORDER — CYCLOBENZAPRINE HCL 5 MG
5 TABLET ORAL 3 TIMES DAILY PRN
Qty: 20 TAB | Refills: 0 | Status: SHIPPED | OUTPATIENT
Start: 2020-10-03 | End: 2022-02-15

## 2020-10-03 ASSESSMENT — FIBROSIS 4 INDEX: FIB4 SCORE: 0.74

## 2020-10-04 NOTE — PROGRESS NOTES
"Chief Complaint   Patient presents with   • Muscle Spasms     Thinks his anxiety is triggering his spasms       HISTORY OF PRESENT ILLNESS: Patient is a 55 y.o. male who presents to urgent care today with concerns of ongoing \"spasms\" to his chest wall.  His symptoms first started approximately 1 month ago with chest and back pain.  He was seen in the emergency department for such and was diagnosed with muscle strain.  He has also seen his PCP for this, a stress test was performed which resulted negative per patient.  Since then he has had intermittent spasms, occurring daily.  The spasms occur to different areas of his chest, right side, epigastric, and upper back.  The spasms last minutes.  He denies associated symptoms with the spasms.  He feels his symptoms are entirely stress and anxiety related.  He has not tried any medication for symptom relief.  He does note history of indigestion and heartburn, does not take any medication for such.  Also notes a long history of anxiety and depression, does not take any medication for that either.  Denies heavy alcohol or illicit drug use.  He is very physically active in his daily and work life.    Patient Active Problem List    Diagnosis Date Noted   • Annual physical exam 05/31/2018   • Family history of colon cancer 07/27/2017   • Tobacco dependence 07/27/2017   • Major depressive disorder, recurrent episode, moderate (HCC) 07/19/2017   • JAY JAY (generalized anxiety disorder) 07/19/2017   • Penile lesion 01/08/2016       Allergies:Patient has no known allergies.    No current [facility]-ordered outpatient medications on file.     No current [facility]-ordered facility-administered medications on file.        Past Medical History:   Diagnosis Date   • Dental disorder     upper and lower dentures   • Heart burn    • Indigestion    • Psychiatric problem     depression,anxiety       Social History     Tobacco Use   • Smoking status: Former Smoker     Packs/day: 1.00     Years: 30.00     " "Pack years: 30.00     Types: Cigarettes     Quit date: 10/11/2018     Years since quittin.9   • Smokeless tobacco: Never Used   • Tobacco comment: patient just bought the nicorette patch   Substance Use Topics   • Alcohol use: Yes     Comment: Intermittent   • Drug use: No       Family Status   Relation Name Status   • Mo     • Fa     • Sis  Alive   • Bro  Alive   • Bro  Alive     Family History   Problem Relation Age of Onset   • Anxiety disorder Mother    • Cancer Mother         Thyroid and possible colon CA   • Depression Father    • Depression Sister    • Depression Brother    • No Known Problems Brother        ROS:  Review of Systems   Constitutional: Negative for fever, chills, weight loss, malaise, and fatigue.   HENT: Negative for ear pain, nosebleeds, congestion, sore throat and neck pain.    Eyes: Negative for vision changes.   Neuro: Negative for headache, sensory changes, weakness, seizure, LOC.   Cardiovascular: Negative for chest pain, palpitations, orthopnea and leg swelling.   Respiratory: Negative for cough, sputum production, shortness of breath and wheezing.   Gastrointestinal: Negative for abdominal pain, nausea, vomiting or diarrhea.   Genitourinary: Negative for dysuria, urgency and frequency.  Musculoskeletal: Positive for chest wall spasms.  Negative for falls, neck pain, back pain, joint pain, myalgias.   Skin: Negative for rash, diaphoresis.     Exam:  /90   Pulse 96   Temp 36.8 °C (98.3 °F) (Temporal)   Resp 16   Ht 1.778 m (5' 10\")   Wt 80.7 kg (178 lb)   SpO2 97%   General: well-nourished, well-developed male in NAD  Head: normocephalic, atraumatic  Eyes: PERRLA, no conjunctival injection, acuity grossly intact, lids normal.  Ears: normal shape and symmetry, no tenderness, no discharge. External canals are without any significant edema or erythema. Tympanic membranes are without any inflammation, no effusion. Gross auditory acuity is intact.  Nose: " symmetrical without tenderness, no discharge.  Mouth/Throat: reasonable hygiene, no erythema, exudates or tonsillar enlargement.  Neck: no masses, range of motion within normal limits, no tracheal deviation. No obvious thyroid enlargement.   Lymph: no cervical adenopathy. No supraclavicular adenopathy.   Neuro: alert and oriented. Cranial nerves 1-12 grossly intact. No sensory deficit.   Cardiovascular: regular rate and rhythm. No edema.  Pulmonary: no distress. Chest is symmetrical with respiration, no wheezes, crackles, or rhonchi.   Abdomen: soft, no significant tenderness, no guarding, no hepatosplenomegaly.  Musculoskeletal: no clubbing, appropriate muscle tone, gait is stable.  There are spasms noted bilateral thoracic spine region.  Skin: warm, dry, intact, no clubbing, no cyanosis, no rashes.   Psych: appropriate mood, affect, judgement.         Assessment/Plan:  1. Muscle spasm  cyclobenzaprine (FLEXERIL) 5 mg tablet         Previous ED visit encounter reviewed and considered in medical decision making today, negative cardiac work up.  Patient presents with muscle spasms to various locations on his chest wall, suspect musculoskeletal.  He is encouraged to trial OTC NSAIDs.  Flexeril provided for breakthrough pain, sedative effects medication discussed.  As a side note, he does have intermittent spasms to his epigastric region, he does have a history of indigestion, encouraged to trial omeprazole as well.  Deep breathing, meditation, and anxiety reduction techniques also addressed.  Supportive care, differential diagnoses, and indications for immediate follow-up discussed with patient.   Pathogenesis of diagnosis discussed including typical length and natural progression.   Instructed to return to clinic or nearest emergency department for any change in condition, further concerns, or worsening of symptoms.  Patient states understanding of the plan of care and discharge instructions.  Instructed to make an  appointment, for follow up, with his primary care provider.        Please note that this dictation was created using voice recognition software. I have made every reasonable attempt to correct obvious errors, but I expect that there are errors of grammar and possibly content that I did not discover before finalizing the note.      CALVIN Contreras.

## 2022-02-10 ENCOUNTER — TELEPHONE (OUTPATIENT)
Dept: SCHEDULING | Facility: IMAGING CENTER | Age: 57
End: 2022-02-10

## 2022-02-15 ENCOUNTER — OFFICE VISIT (OUTPATIENT)
Dept: MEDICAL GROUP | Facility: LAB | Age: 57
End: 2022-02-15
Payer: COMMERCIAL

## 2022-02-15 VITALS
BODY MASS INDEX: 26.05 KG/M2 | HEIGHT: 70 IN | TEMPERATURE: 98.1 F | OXYGEN SATURATION: 97 % | RESPIRATION RATE: 16 BRPM | WEIGHT: 182 LBS | DIASTOLIC BLOOD PRESSURE: 82 MMHG | HEART RATE: 94 BPM | SYSTOLIC BLOOD PRESSURE: 134 MMHG

## 2022-02-15 DIAGNOSIS — M25.512 ACUTE PAIN OF LEFT SHOULDER: ICD-10-CM

## 2022-02-15 DIAGNOSIS — Z13.220 LIPID SCREENING: ICD-10-CM

## 2022-02-15 DIAGNOSIS — Z12.5 PROSTATE CANCER SCREENING: ICD-10-CM

## 2022-02-15 DIAGNOSIS — Z00.00 WELLNESS EXAMINATION: ICD-10-CM

## 2022-02-15 PROCEDURE — 99386 PREV VISIT NEW AGE 40-64: CPT | Performed by: PHYSICIAN ASSISTANT

## 2022-02-15 RX ORDER — MELOXICAM 7.5 MG/1
7.5 TABLET ORAL 2 TIMES DAILY PRN
Qty: 20 TABLET | Refills: 0 | Status: SHIPPED | OUTPATIENT
Start: 2022-02-15 | End: 2022-02-25

## 2022-02-15 ASSESSMENT — FIBROSIS 4 INDEX: FIB4 SCORE: 0.77

## 2022-02-15 NOTE — PROGRESS NOTES
"Subjective:     CC:  Diagnoses of Wellness examination, Prostate cancer screening, Lipid screening, and Acute pain of left shoulder were pertinent to this visit.    HISTORY OF THE PRESENT ILLNESS: Patient is a 57 y.o. male. This pleasant patient is here today to establish care and discuss L shoulder pain. His/her prior PCP was Dr. Massey at .    L shoulder pain  Pt reports he woke up one morning and felt L shoulder pain. Started 12 days. Denies any recent falls or injuries. ROM intact. He notes some numbness extending into the forearm. Denies recent loss of  strength. Denies previous surgeries, or xrays of shoulder. OTC ibuprofen does not seem to help.   Sometimes extends into the base of neck.     Health Maintenance     - Dyslipidemia (30-45): Ordered today  - Diabetes (HTN, HLD, BMI >25): Ordered today  - Dental: Upper and lower dentures  Diet: \" Pretty crap\"  Exercise:  sedentary  Substance Use:  Denies  Tobacco Use/counseling: Denies  Safe in relationship: Getting  later this year  Seat belts, bike helmet, gun safety discussed.  Sun protection used.       Cancer screening  Colon cancer: Reports colonoscopy 2018, 1 polyp with [facility], we will request records  - Prostate Cancer Screening/PSA:  Ordered today     Infectious disease screening/Immunizations  --Immunizations:               Influenza:  Up-to-date   Covid-19: Up-to-date               Tetanus: up-to-date              Shingles: please get this at a local pharmacy    Current Outpatient Medications Ordered in Epic   Medication Sig Dispense Refill   • meloxicam (MOBIC) 7.5 MG Tab Take 1 Tablet by mouth 2 times a day as needed for Moderate Pain for up to 10 days. 20 Tablet 0     No current Epic-ordered facility-administered medications on file.       Health Maintenance: Completed    ROS:   Gen: no fevers/chills, no changes in weight  Eyes: no changes in vision  ENT: no sore throat, no hearing loss, no bloody nose  Pulm: no sob, no " "cough  CV: no chest pain, no palpitations  GI: no nausea/vomiting, no diarrhea  : no dysuria  MSk: no myalgias  Skin: no rash  Neuro: no headaches, no numbness/tingling  Heme/Lymph: no easy bruising      Objective:     Exam: /82   Pulse 94   Temp 36.7 °C (98.1 °F)   Resp 16   Ht 1.778 m (5' 10\")   Wt 82.6 kg (182 lb)   SpO2 97%  Body mass index is 26.11 kg/m².    General: Normal appearing. No distress.  HEENT: Normocephalic. Eyes conjunctiva clear lids without ptosis, pupils equal and reactive to light accommodation, ears normal shape and contour, canals are clear bilaterally, tympanic membranes are benign, nasal mucosa benign, oropharynx is without erythema, edema or exudates.   Neck: Supple without JVD or bruit. Thyroid is not enlarged.  Pulmonary: Clear to ausculation.  Normal effort. No rales, ronchi, or wheezing.  Cardiovascular: Regular rate and rhythm without murmur. Carotid and radial pulses are intact and equal bilaterally.  Abdomen: Soft, nontender, nondistended. Normal bowel sounds. Liver and spleen are not palpable  Neurologic: Grossly nonfocal  Lymph: No cervical or supraclavicular lymph nodes are palpable  Skin: Warm and dry.  No obvious lesions.  Musculoskeletal: Normal gait. No extremity cyanosis, clubbing, or edema.  Psych: Normal mood and affect. Alert and oriented x3. Judgment and insight is normal.  L Shoulder: Full ROM, full strength, empty can test negative, drop arm test negative, Hawkin's negative tender to palpation on superior portion of the scapula      Assessment & Plan:   57 y.o. male with the following -    1. Wellness examination  2. Prostate cancer screening  3. Lipid screening  Labs per orders  Patient states he had recent colonoscopy in 2018, we will request records for this  Patient is up-to-date on vaccines except for shingles, which she will get at local pharmacy    4. Acute pain of left shoulder  Acute condition, new problem  Trial of meloxicam 7.5 mg twice daily " as needed  Left shoulder x-ray ordered  Discussed gentle stretches and exercises as tolerated  Consider nerve conduction study if numbness/tingling persist  Consider physical therapy referral  Reassess in 2 months      I spent a total of 17 minutes with record review, exam, communication with the patient, communication with other providers, and documentation of this encounter.    Return in about 3 months (around 5/15/2022).    Please note that this dictation was created using voice recognition software. I have made every reasonable attempt to correct obvious errors, but I expect that there are errors of grammar and possibly content that I did not discover before finalizing the note.

## 2023-02-16 ENCOUNTER — OFFICE VISIT (OUTPATIENT)
Dept: URGENT CARE | Facility: PHYSICIAN GROUP | Age: 58
End: 2023-02-16
Payer: COMMERCIAL

## 2023-02-16 VITALS
WEIGHT: 178 LBS | TEMPERATURE: 98.8 F | DIASTOLIC BLOOD PRESSURE: 76 MMHG | SYSTOLIC BLOOD PRESSURE: 126 MMHG | HEART RATE: 87 BPM | OXYGEN SATURATION: 96 % | HEIGHT: 70 IN | BODY MASS INDEX: 25.48 KG/M2 | RESPIRATION RATE: 12 BRPM

## 2023-02-16 DIAGNOSIS — J06.9 VIRAL UPPER RESPIRATORY ILLNESS: ICD-10-CM

## 2023-02-16 PROCEDURE — 99213 OFFICE O/P EST LOW 20 MIN: CPT | Performed by: PHYSICIAN ASSISTANT

## 2023-02-16 RX ORDER — FLUTICASONE PROPIONATE 50 MCG
1 SPRAY, SUSPENSION (ML) NASAL DAILY
Qty: 16 G | Refills: 0 | Status: SHIPPED | OUTPATIENT
Start: 2023-02-16

## 2023-02-16 RX ORDER — METHYLPREDNISOLONE 4 MG/1
4 TABLET ORAL DAILY
Qty: 21 TABLET | Refills: 0 | Status: SHIPPED | OUTPATIENT
Start: 2023-02-16

## 2023-02-16 ASSESSMENT — ENCOUNTER SYMPTOMS
DIZZINESS: 0
NAUSEA: 0
VOMITING: 0
FEVER: 0
CONSTIPATION: 0
DIARRHEA: 0
DIAPHORESIS: 0
SINUS PAIN: 0
SHORTNESS OF BREATH: 0
CHILLS: 0
WHEEZING: 0
EYE PAIN: 0
COUGH: 1
SORE THROAT: 1
EYE DISCHARGE: 0
HEADACHES: 1
ABDOMINAL PAIN: 0
EYE REDNESS: 0

## 2023-02-17 NOTE — PROGRESS NOTES
"  Subjective:     Bruno Rai  is a 58 y.o. male who presents for Headache (Pt has body aches, headache, sore throat x 4 days )       He presents today with headache, general malaise, sore throat x4 days.  Notes that he was recently in [place], denies any recent known close sick contacts.  Has not done any at home COVID testing.  Has been taking over-the-counter medications.  Denies fever/chills/sweats, chest pain, shortness of breath, nausea/vomiting, abdominal pain, diarrhea.  Does have a history of COPD.     Review of Systems   Constitutional:  Negative for chills, diaphoresis, fever and malaise/fatigue.   HENT:  Positive for congestion and sore throat. Negative for ear discharge and sinus pain.    Eyes:  Negative for pain, discharge and redness.   Respiratory:  Positive for cough. Negative for shortness of breath and wheezing.    Cardiovascular:  Negative for chest pain.   Gastrointestinal:  Negative for abdominal pain, constipation, diarrhea, nausea and vomiting.   Genitourinary:  Negative for dysuria, frequency and urgency.   Neurological:  Positive for headaches. Negative for dizziness.    No Known Allergies  Past Medical History:   Diagnosis Date    Dental disorder     upper and lower dentures    Heart burn         Objective:   /76 (BP Location: Left arm, Patient Position: Sitting, BP Cuff Size: Adult)   Pulse 87   Temp 37.1 °C (98.8 °F) (Temporal)   Resp 12   Ht 1.778 m (5' 10\")   Wt 80.7 kg (178 lb)   SpO2 96%   BMI 25.54 kg/m²   Physical Exam  Vitals and nursing note reviewed.   Constitutional:       General: He is not in acute distress.     Appearance: Normal appearance. He is not ill-appearing, toxic-appearing or diaphoretic.   HENT:      Head: Normocephalic.      Right Ear: Tympanic membrane, ear canal and external ear normal. There is no impacted cerumen.      Left Ear: Tympanic membrane, ear canal and external ear normal. There is no impacted cerumen.      Nose: No congestion or " rhinorrhea.      Mouth/Throat:      Mouth: Mucous membranes are moist.      Pharynx: No oropharyngeal exudate or posterior oropharyngeal erythema.   Eyes:      General:         Right eye: No discharge.         Left eye: No discharge.      Conjunctiva/sclera: Conjunctivae normal.   Cardiovascular:      Rate and Rhythm: Normal rate and regular rhythm.   Pulmonary:      Effort: Pulmonary effort is normal. No respiratory distress.      Breath sounds: Normal breath sounds. No stridor. No wheezing or rhonchi.   Musculoskeletal:      Cervical back: Neck supple.   Lymphadenopathy:      Cervical: No cervical adenopathy.   Neurological:      General: No focal deficit present.      Mental Status: He is alert and oriented to person, place, and time.   Psychiatric:         Mood and Affect: Mood normal.         Behavior: Behavior normal.         Thought Content: Thought content normal.         Judgment: Judgment normal.           Diagnostic testing: None    Assessment/Plan:     Encounter Diagnoses   Name Primary?    Viral upper respiratory illness           Plan for care for today's complaint includes Medrol Dosepak, intranasal Flonase for viral upper respiratory symptoms.  No evidence of wheezing or COPD exacerbation on physical exam today.  Patient is well-appearing and vital signs are stable.  To the dose COVID testing with the patient today, patient is well-appearing and vital signs are stable on exam today, as the patient is on his fourth day of symptoms I do feel that testing for COVID-19 would not change my treatment plan at this time; patient did agree with this strategy of withholding from testing.  Continue to monitor symptoms and return to urgent care or follow-up with primary care provider if symptoms remain ongoing.  Follow-up in the emergency department if symptoms become severe, ER precautions discussed in office today..  Prescription for Medrol Dosepak, Flonase provided.    See AVS Instructions below for written  guidance provided to patient on after-visit management and care in addition to our verbal discussion during the visit.    Please note that this dictation was created using voice recognition software. I have attempted to correct all errors, but there may be sound-alike, spelling, grammar and possibly content errors that I did not discover before finalizing the note.    Fredi Carmona PA-C

## 2023-11-13 ENCOUNTER — APPOINTMENT (OUTPATIENT)
Dept: RADIOLOGY | Facility: IMAGING CENTER | Age: 58
End: 2023-11-13
Attending: PHYSICIAN ASSISTANT
Payer: COMMERCIAL

## 2023-11-13 ENCOUNTER — OFFICE VISIT (OUTPATIENT)
Dept: URGENT CARE | Facility: PHYSICIAN GROUP | Age: 58
End: 2023-11-13
Payer: COMMERCIAL

## 2023-11-13 VITALS
OXYGEN SATURATION: 96 % | RESPIRATION RATE: 16 BRPM | WEIGHT: 183 LBS | DIASTOLIC BLOOD PRESSURE: 80 MMHG | BODY MASS INDEX: 26.2 KG/M2 | HEART RATE: 80 BPM | SYSTOLIC BLOOD PRESSURE: 140 MMHG | HEIGHT: 70 IN | TEMPERATURE: 97.1 F

## 2023-11-13 DIAGNOSIS — S63.502A LEFT WRIST SPRAIN, INITIAL ENCOUNTER: ICD-10-CM

## 2023-11-13 DIAGNOSIS — S69.92XA WRIST INJURY, LEFT, INITIAL ENCOUNTER: ICD-10-CM

## 2023-11-13 PROCEDURE — 73110 X-RAY EXAM OF WRIST: CPT | Mod: TC,LT | Performed by: RADIOLOGY

## 2023-11-13 PROCEDURE — 3079F DIAST BP 80-89 MM HG: CPT | Performed by: PHYSICIAN ASSISTANT

## 2023-11-13 PROCEDURE — 3077F SYST BP >= 140 MM HG: CPT | Performed by: PHYSICIAN ASSISTANT

## 2023-11-13 PROCEDURE — 99213 OFFICE O/P EST LOW 20 MIN: CPT | Performed by: PHYSICIAN ASSISTANT

## 2023-11-13 NOTE — PROGRESS NOTES
"Subjective     Bruno Rai is a 58 y.o. male who presents with Wrist Injury (Pt heard pop in L wrist when throwing 1lb ball. )    Pt PMH, SocHx, SurgHx, FamHx, Drug allergies and medications reviewed with pt/[facility].      Family history reviewed, it is not pertinent to this complaint.           Patient presents with:  Wrist Injury: Pt heard pop in L wrist when throwing a ball to his dog this morning. PT has pain with all ROM but worst with extension. PT denies numbness/tingling to hand. PT is right handed, no previous injury to this hand or wrist in past.  No other complaint.         Wrist Injury   The incident occurred 3 to 6 hours ago. The incident occurred at home. The injury mechanism is unknown. The pain is present in the left wrist. The quality of the pain is described as aching and burning. The pain radiates to the left hand. The pain is at a severity of 5/10. The pain is moderate. The pain has been Constant since the incident. Pertinent negatives include no numbness or tingling. The symptoms are aggravated by movement, lifting and palpation. He has tried rest and NSAIDs for the symptoms. The treatment provided mild relief.       Review of Systems   Musculoskeletal:  Positive for joint pain.   Neurological:  Negative for tingling, sensory change and numbness.   All other systems reviewed and are negative.             Objective     BP (!) 140/80 (BP Location: Left arm, Patient Position: Sitting, BP Cuff Size: Adult)   Pulse 80   Temp 36.2 °C (97.1 °F) (Temporal)   Resp 16   Ht 1.778 m (5' 10\")   Wt 83 kg (183 lb)   SpO2 96%   BMI 26.26 kg/m²      Physical Exam  Vitals and nursing note reviewed.   Constitutional:       General: He is not in acute distress.     Appearance: Normal appearance. He is well-developed and normal weight.   HENT:      Head: Normocephalic and atraumatic.      Nose: Nose normal.      Mouth/Throat:      Mouth: Mucous membranes are moist.   Eyes:      Extraocular Movements: " Extraocular movements intact.      Conjunctiva/sclera: Conjunctivae normal.      Pupils: Pupils are equal, round, and reactive to light.   Cardiovascular:      Rate and Rhythm: Normal rate and regular rhythm.      Pulses: Normal pulses.      Heart sounds: Normal heart sounds.   Pulmonary:      Effort: Pulmonary effort is normal.      Breath sounds: Normal breath sounds.   Abdominal:      Palpations: Abdomen is soft.   Musculoskeletal:      Left wrist: Swelling, tenderness and bony tenderness present. No snuff box tenderness or crepitus. Decreased range of motion.        Hands:       Cervical back: Normal range of motion and neck supple.      Comments: Pain all ROM, worst with extension and resisted supination.  No snuffbox ttp,  painful.    Skin:     General: Skin is warm and dry.      Capillary Refill: Capillary refill takes less than 2 seconds.   Neurological:      General: No focal deficit present.      Mental Status: He is alert and oriented to person, place, and time.      Motor: No abnormal muscle tone.   Psychiatric:         Mood and Affect: Mood normal.                             Assessment & Plan             1. Left wrist sprain, initial encounter  DX-WRIST-COMPLETE 3+ LEFT          RADIOLOGY RESULTS   DX-WRIST-COMPLETE 3+ LEFT    Result Date: 11/13/2023 11/13/2023 1:48 PM HISTORY/REASON FOR EXAM:  Pain/Deformity Following Trauma TECHNIQUE/EXAM DESCRIPTION AND NUMBER OF VIEWS: 4 of the left wrist COMPARISON: None. FINDINGS: There is normal bony mineralization.  There is no evidence of fracture, dislocation, or osseous lesion.  There is no evidence of soft tissue injury. There is a faint amorphous calcification adjacent to the dorsal surface of the scaphoid.     1.  No radiographic evidence of acute injury.. 2. Faint amorphous calcification adjacent dorsal surface of the scaphoid which may represent an intra-articular loose body, or hydroxyapatite deposition disease.           RICE TREATMENT FOR  EXTREMITY INJURIES:  R-rest the extremity as much as possible while pain and swelling persist  I-ice the extremity 15 minutes every 2 hours for the first 24 hours, then 4-5 times daily   C-compress the extremity either with splint or ace wrap as directed  E-elevate the extremity to help with swelling    Velcro wrist brace applied to left wrist.     Motrin/Advil/Ibuprophen 600 mg every 6 hours as needed for pain or fever.    Differential diagnosis, supportive care, and indications for immediate follow-up discussed with patient.  Instructed to return to clinic or nearest emergency department for any change in condition, further concerns, or worsening of symptoms.    I personally reviewed prior external notes and test results pertinent to today's visit.  I have independently reviewed and interpreted all diagnostics ordered during this urgent care visit.    PT should follow up with PCP in 1-2 days for re-evaluation if symptoms have not improved.      Discussed red flags and reasons to return to UC or ED.      Pt and/or family verbalized understanding of diagnosis and follow up instructions and was offered informational handout on diagnosis.  PT discharged.     Please note that this dictation was created using voice recognition software. I have made every reasonable attempt to correct obvious errors, but I expect that there may be errors of grammar and possibly content that I did not discover before finalizing the note.

## 2023-11-13 NOTE — LETTER
November 13, 2023         Patient: Bruno Rai   YOB: 1965   Date of Visit: 11/13/2023           To Whom it May Concern:    Bruno Rai was seen in my clinic on 11/13/2023. He may return to work on 11/13/2023.    If you have any questions or concerns, please don't hesitate to call.        Sincerely,           Bere Pierce P.A.-C.  Electronically Signed

## 2023-11-14 ASSESSMENT — ENCOUNTER SYMPTOMS
TINGLING: 0
NUMBNESS: 0
SENSORY CHANGE: 0

## 2025-04-28 ENCOUNTER — OFFICE VISIT (OUTPATIENT)
Dept: URGENT CARE | Facility: PHYSICIAN GROUP | Age: 60
End: 2025-04-28
Payer: COMMERCIAL

## 2025-04-28 ENCOUNTER — APPOINTMENT (OUTPATIENT)
Dept: RADIOLOGY | Facility: IMAGING CENTER | Age: 60
End: 2025-04-28
Payer: COMMERCIAL

## 2025-04-28 VITALS
OXYGEN SATURATION: 97 % | BODY MASS INDEX: 25.77 KG/M2 | SYSTOLIC BLOOD PRESSURE: 144 MMHG | DIASTOLIC BLOOD PRESSURE: 80 MMHG | WEIGHT: 180 LBS | TEMPERATURE: 98.8 F | HEIGHT: 70 IN | HEART RATE: 73 BPM | RESPIRATION RATE: 20 BRPM

## 2025-04-28 DIAGNOSIS — S97.101A CRUSH INJURY OF TOE, RIGHT, INITIAL ENCOUNTER: ICD-10-CM

## 2025-04-28 DIAGNOSIS — S90.414A: ICD-10-CM

## 2025-04-28 DIAGNOSIS — S92.511A CLOSED DISPLACED FRACTURE OF PROXIMAL PHALANX OF LESSER TOE OF RIGHT FOOT, INITIAL ENCOUNTER: ICD-10-CM

## 2025-04-28 PROCEDURE — 73630 X-RAY EXAM OF FOOT: CPT | Mod: TC,RT | Performed by: RADIOLOGY

## 2025-04-28 RX ORDER — MUPIROCIN 20 MG/G
1 OINTMENT TOPICAL 2 TIMES DAILY
Qty: 22 G | Refills: 0 | Status: SHIPPED | OUTPATIENT
Start: 2025-04-28

## 2025-04-28 RX ORDER — LOSARTAN POTASSIUM 50 MG/1
TABLET ORAL
COMMUNITY
Start: 2025-02-13

## 2025-04-28 RX ORDER — KETOROLAC TROMETHAMINE 15 MG/ML
15 INJECTION, SOLUTION INTRAMUSCULAR; INTRAVENOUS ONCE
Status: COMPLETED | OUTPATIENT
Start: 2025-04-28 | End: 2025-04-28

## 2025-04-28 RX ORDER — CEPHALEXIN 500 MG/1
500 CAPSULE ORAL 4 TIMES DAILY
Qty: 20 CAPSULE | Refills: 0 | Status: SHIPPED | OUTPATIENT
Start: 2025-04-28 | End: 2025-05-03

## 2025-04-28 RX ORDER — HYDROXYZINE HYDROCHLORIDE 50 MG/1
TABLET, FILM COATED ORAL
COMMUNITY

## 2025-04-28 RX ORDER — AMLODIPINE BESYLATE 5 MG/1
TABLET ORAL
COMMUNITY

## 2025-04-28 RX ADMIN — KETOROLAC TROMETHAMINE 15 MG: 15 INJECTION, SOLUTION INTRAMUSCULAR; INTRAVENOUS at 15:34

## 2025-04-28 ASSESSMENT — ENCOUNTER SYMPTOMS
NECK PAIN: 0
FALLS: 0
MYALGIAS: 0
BACK PAIN: 0

## 2025-04-28 NOTE — PROGRESS NOTES
Subjective:   CHIEF COMPLAINT  Chief Complaint   Patient presents with    Laceration     Right foot,right toe,today         Bruno Rai is a very pleasant 60 y.o. male who presents for Laceration (Right foot,right toe,today)      Patient presents with complaints of pain, swelling and bleeding in his right fifth toe.  States he was getting ready for work this morning he excellently stubbed it on his bed frame.  Initially iced it and put a Band-Aid on it though states is now affecting how he walks throughout the day.  He denies any other injuries or traumas no previous injury to the area    Laceration         Review of Systems   Musculoskeletal:  Positive for joint pain. Negative for back pain, falls, myalgias and neck pain.   All other systems reviewed and are negative.    Refer to HPI for additional details.    During this visit, appropriate PPE was worn, and hand hygiene was performed.    PMH:  has a past medical history of Dental disorder and Heart burn.    MEDS:   Current Outpatient Medications:     losartan (COZAAR) 50 MG Tab, , Disp: , Rfl:     hydrOXYzine HCl (ATARAX) 50 MG Tab, , Disp: , Rfl:     mupirocin (BACTROBAN) 2 % Ointment, Apply 1 Application topically 2 times a day., Disp: 22 g, Rfl: 0    cephALEXin (KEFLEX) 500 MG Cap, Take 1 Capsule by mouth 4 times a day for 5 days., Disp: 20 Capsule, Rfl: 0    amLODIPine (NORVASC) 5 MG Tab, , Disp: , Rfl:     methylPREDNISolone (MEDROL DOSEPAK) 4 MG Tablet Therapy Pack, Take 1 Tablet by mouth every day. Follow schedule on package instructions. (Patient not taking: Reported on 4/28/2025), Disp: 21 Tablet, Rfl: 0    fluticasone (FLONASE) 50 MCG/ACT nasal spray, Administer 1 Spray into affected nostril(S) every day. (Patient not taking: Reported on 4/28/2025), Disp: 16 g, Rfl: 0    ALLERGIES: No Known Allergies  SURGHX:   Past Surgical History:   Procedure Laterality Date    BIOPSY GENERAL N/A 1/8/2016    Procedure: BIOPSY GENERAL PENILE;  Surgeon: Abel DAMON  "SHAWN Kee;  Location: SURGERY [facility];  Service:     OTHER      vasectomy    VASECTOMY       SOCHX:  reports that he quit smoking about 6 years ago. His smoking use included cigarettes. He started smoking about 36 years ago. He has a 30 pack-year smoking history. He has never used smokeless tobacco. He reports that he does not currently use alcohol. He reports that he does not use drugs.    FH: Per HPI as applicable/pertinent.    Medications, Allergies, and current problem list reviewed today in Epic.     Objective:     BP (!) 144/80   Pulse 73   Temp 37.1 °C (98.8 °F) (Temporal)   Resp 20   Ht 1.778 m (5' 10\")   Wt 81.6 kg (180 lb)   SpO2 97%     Physical Exam  Constitutional:       General: He is not in acute distress.     Appearance: Normal appearance. He is not ill-appearing.   HENT:      Head: Normocephalic and atraumatic.   Musculoskeletal:      Right foot: Decreased range of motion. Swelling, laceration (Laceration to fifth toe right side) and tenderness present. No bunion, Charcot foot, foot drop or prominent metatarsal heads.      Left foot: Normal.        Legs:         Feet:    Feet:      Comments: Marked swelling and ecchymosis in above marked area.  Tenderness to palpation along the fifth metatarsal and on the fifth toe.  Medial nail border with small amount of dried blood, hemostasis achieved prior to arrival no obvious dislocation, possible deformity difficult to tell due to swelling  Neurological:      Mental Status: He is alert.         Assessment/Plan:     Diagnosis and associated orders:     1. Closed displaced fracture of proximal phalanx of lesser toe of right foot, initial encounter  - Toe Splint  - Referral to Orthopedics    2. Crush injury of toe, right, initial encounter  - ketorolac (Toradol) 15 MG/ML injection 15 mg  - DX-FOOT-COMPLETE 3+ RIGHT; Future    3. Abrasion of fifth toe, right, initial encounter  - mupirocin (BACTROBAN) 2 % Ointment; Apply 1 Application topically " 2 times a day.  Dispense: 22 g; Refill: 0  - cephALEXin (KEFLEX) 500 MG Cap; Take 1 Capsule by mouth 4 times a day for 5 days.  Dispense: 20 Capsule; Refill: 0    Other orders  - amLODIPine (NORVASC) 5 MG Tab;  (Patient not taking: Reported on 4/28/2025)  - losartan (COZAAR) 50 MG Tab  - hydrOXYzine HCl (ATARAX) 50 MG Tab     Comments/MDM:     Patient history and physical exam consistent with crush injury and subsequent abrasion of the right fifth toe.    I discussed HPI and physical exam with patient.  Advise doing in clinic x-ray at this time as it is difficult to fully assess whether patient's toe has deformity or dislocation due to swelling and ecchymosis.  In clinic x-ray right foot fracture of the fifth proximal phalanx.  Nondisplaced  15 mg Toradol given in clinic for acute pain, patient stated afforded great relief  Outpatient management will consist of dynamic splinting to the affected extremity, will put in referral for Ortho for second evaluation and potential treatment if need be, staggered Tylenol and ibuprofen, cephalexin for infected abrasion, RICE therapies, monitor symptoms  Follow up in 3-5 days if no improvement in symptoms           Differential diagnosis, natural history, supportive care, and indications for immediate follow-up discussed.    Advised the patient to follow-up with the primary care physician for recheck, reevaluation, and consideration of further management.    Please note that this dictation was created using voice recognition software. I have made a reasonable attempt to correct obvious errors, but I expect that there are errors of grammar and possibly content that I did not discover before finalizing the note.    This note was electronically signed by MACK Sarah

## 2025-04-28 NOTE — LETTER
April 28, 2025    To Whom It May Concern:         This is confirmation that Bruno Rutherford [place] attended his scheduled appointment with MACK Sarah on 4/28/25.  He may return to work starting 4/30/2025         If you have any questions please do not hesitate to call me at the phone number listed below.    Sincerely,          CALVIN Sarah.  [phone]

## 2025-05-05 NOTE — Clinical Note
REFERRAL APPROVAL NOTICE         Sent on May 5, 2025                   Bruno Florezling  [address] [place] [address]                   Dear Mr. Rai,    After a careful review of the medical information and benefit coverage, [facility] has processed your referral. See below for additional details.    If applicable, you must be actively enrolled with your insurance for coverage of the authorized service. If you have any questions regarding your coverage, please contact your insurance directly.    REFERRAL INFORMATION   Referral #:  [id number]  Referred-To Department    Referred-By Provider:  Orthopedics    MACK Sarah   [facility] [address] [place] [address]  [place] [address]  [phone] [address] [place] [address]  [place] [address]  [phone]    Referral Start Date:  04/28/2025  Referral End Date:   04/28/2026             SCHEDULING  If you do not already have an appointment, please call [phone] to make an appointment.     MORE INFORMATION  If you do not already have a [employer] account, sign up at: [email or web address]  You can access your medical information, make appointments, see lab results, billing information, and more.  If you have questions regarding this referral, please contact  the [facility] Referrals department at:             [phone]. Monday - Friday 8:00AM - 5:00PM.     Sincerely,    [facility]